# Patient Record
Sex: FEMALE | Race: WHITE | NOT HISPANIC OR LATINO | Employment: UNEMPLOYED | URBAN - METROPOLITAN AREA
[De-identification: names, ages, dates, MRNs, and addresses within clinical notes are randomized per-mention and may not be internally consistent; named-entity substitution may affect disease eponyms.]

---

## 2021-11-20 ENCOUNTER — OFFICE VISIT (OUTPATIENT)
Dept: URGENT CARE | Facility: CLINIC | Age: 26
End: 2021-11-20

## 2021-11-20 VITALS
OXYGEN SATURATION: 100 % | DIASTOLIC BLOOD PRESSURE: 62 MMHG | SYSTOLIC BLOOD PRESSURE: 110 MMHG | TEMPERATURE: 98 F | HEIGHT: 63 IN | HEART RATE: 94 BPM

## 2021-11-20 DIAGNOSIS — L03.114 CELLULITIS OF LEFT UPPER ARM: ICD-10-CM

## 2021-11-20 DIAGNOSIS — T80.90XA INJECTION SITE REACTION, INITIAL ENCOUNTER: ICD-10-CM

## 2021-11-20 DIAGNOSIS — R21 RASH: ICD-10-CM

## 2021-11-20 PROCEDURE — 99203 OFFICE O/P NEW LOW 30 MIN: CPT | Performed by: NURSE PRACTITIONER

## 2021-11-20 RX ORDER — ONDANSETRON 8 MG/1
TABLET, ORALLY DISINTEGRATING ORAL
COMMUNITY
End: 2021-12-26

## 2021-11-20 RX ORDER — ALBUTEROL SULFATE 90 UG/1
AEROSOL, METERED RESPIRATORY (INHALATION)
COMMUNITY
End: 2023-06-22 | Stop reason: SDUPTHER

## 2021-11-20 RX ORDER — CEPHALEXIN 500 MG/1
500 CAPSULE ORAL 4 TIMES DAILY
Qty: 28 CAPSULE | Refills: 0 | Status: SHIPPED | OUTPATIENT
Start: 2021-11-20 | End: 2021-11-27

## 2021-11-20 RX ORDER — TRIAMCINOLONE ACETONIDE 1 MG/G
1 CREAM TOPICAL 2 TIMES DAILY
Qty: 15 G | Refills: 0 | Status: SHIPPED | OUTPATIENT
Start: 2021-11-20 | End: 2022-03-28

## 2021-11-20 RX ORDER — HYDROCODONE BITARTRATE AND ACETAMINOPHEN 5; 325 MG/1; MG/1
TABLET ORAL
COMMUNITY
End: 2021-12-26

## 2021-11-20 RX ORDER — OXYCODONE HYDROCHLORIDE AND ACETAMINOPHEN 5; 325 MG/1; MG/1
TABLET ORAL
COMMUNITY
End: 2021-12-26

## 2021-11-20 ASSESSMENT — ENCOUNTER SYMPTOMS
CHILLS: 0
MUSCULOSKELETAL NEGATIVE: 1
FEVER: 0
NEUROLOGICAL NEGATIVE: 1
CONSTITUTIONAL NEGATIVE: 1

## 2021-11-20 ASSESSMENT — VISUAL ACUITY: OU: 1

## 2021-11-20 NOTE — PROGRESS NOTES
"Subjective:     Baylee Perez is a 26 y.o. female who presents for Arm Pain (x 3days, pain in arm after shot, tender/warm/swelling/, pain in armpit iand)       Arm Pain   The pain has been worsening since the incident.     Patient received Moderna vaccine as well as flu vaccine on 11/18/2021.    Patient reports receiving the flu shot on her right upper arm as well as the Covid shot on the left upper arm.    Today, noticed a circular area of swelling, redness, and warmth.  Circled area to monitor for any progression.  Reports tenderness to touch with pain radiating to her armpit.    Patient was screened prior to rooming and denied COVID-19 diagnosis or contact with a person who has been diagnosed or is suspected to have COVID-19. During this visit, appropriate PPE was worn, hand hygiene was performed, and the patient and any visitors were masked.     PMH:  has no past medical history on file.    MEDS:   Current Outpatient Medications:   •  cephALEXin (KEFLEX) 500 MG Cap, Take 1 Capsule by mouth 4 times a day for 7 days., Disp: 28 Capsule, Rfl: 0  •  triamcinolone acetonide (KENALOG) 0.1 % Cream, Apply 1 Application topically 2 times a day., Disp: 15 g, Rfl: 0    ALLERGIES: No Known Allergies    SURGHX: History reviewed. No pertinent surgical history.    SOCHX:  reports that she has never smoked. She has never used smokeless tobacco.     FH: Reviewed with patient, not pertinent to this visit.    Review of Systems   Constitutional: Negative.  Negative for chills, fever and malaise/fatigue.   Musculoskeletal: Negative.    Skin:        Left upper arm redness, swelling, warmth, tenderness   Neurological: Negative.    All other systems reviewed and are negative.    Additional details per HPI.      Objective:     /62 (BP Location: Right arm, Patient Position: Sitting, BP Cuff Size: Adult)   Pulse 94   Temp 36.7 °C (98 °F) (Temporal)   Ht 1.6 m (5' 3\")   SpO2 100%     Physical Exam  Vitals reviewed. "   Constitutional:       General: She is not in acute distress.     Appearance: She is well-developed. She is not ill-appearing or toxic-appearing.   HENT:      Right Ear: External ear normal.      Left Ear: External ear normal.   Eyes:      General: Vision grossly intact.      Extraocular Movements: Extraocular movements intact.      Conjunctiva/sclera: Conjunctivae normal.   Cardiovascular:      Rate and Rhythm: Normal rate.      Pulses: Normal pulses.   Pulmonary:      Effort: Pulmonary effort is normal. No respiratory distress.   Musculoskeletal:         General: No deformity. Normal range of motion.      Left upper arm: Swelling and tenderness present. No deformity or lacerations.      Cervical back: Normal range of motion.      Comments: Approx 3.5 x 3.5 cm area of induration, erythema, warmth, and tenderness; skin intact, no fluctuance or discharge, axilla clear   Skin:     General: Skin is warm and dry.      Coloration: Skin is not pale.      Findings: Erythema present.   Neurological:      Mental Status: She is alert and oriented to person, place, and time.      Sensory: No sensory deficit.      Motor: No weakness.   Psychiatric:         Behavior: Behavior normal. Behavior is cooperative.       Assessment/Plan:     1. Cellulitis of left upper arm  - cephALEXin (KEFLEX) 500 MG Cap; Take 1 Capsule by mouth 4 times a day for 7 days.  Dispense: 28 Capsule; Refill: 0    2. Rash  - triamcinolone acetonide (KENALOG) 0.1 % Cream; Apply 1 Application topically 2 times a day.  Dispense: 15 g; Refill: 0    3. Injection site reaction, initial encounter  - triamcinolone acetonide (KENALOG) 0.1 % Cream; Apply 1 Application topically 2 times a day.  Dispense: 15 g; Refill: 0    Rx as above sent electronically.  Keflex for coverage of secondary infectious process.  Possible localized injection site reaction.  Kenalog cream for symptom relief    Differential diagnosis, natural history, supportive care, over-the-counter  symptom management per 's instructions, close monitoring, and indications for immediate follow-up discussed.     All questions answered. Patient agrees with the plan of care.    Discharge summary provided through [facility].

## 2021-12-26 ENCOUNTER — OFFICE VISIT (OUTPATIENT)
Dept: URGENT CARE | Facility: CLINIC | Age: 26
End: 2021-12-26
Payer: COMMERCIAL

## 2021-12-26 VITALS
SYSTOLIC BLOOD PRESSURE: 110 MMHG | DIASTOLIC BLOOD PRESSURE: 68 MMHG | HEART RATE: 55 BPM | TEMPERATURE: 99 F | RESPIRATION RATE: 14 BRPM | WEIGHT: 136 LBS | HEIGHT: 63 IN | BODY MASS INDEX: 24.1 KG/M2 | OXYGEN SATURATION: 99 %

## 2021-12-26 DIAGNOSIS — L50.9 URTICARIA: ICD-10-CM

## 2021-12-26 PROCEDURE — 99214 OFFICE O/P EST MOD 30 MIN: CPT | Performed by: STUDENT IN AN ORGANIZED HEALTH CARE EDUCATION/TRAINING PROGRAM

## 2021-12-26 RX ORDER — METHYLPREDNISOLONE 4 MG/1
TABLET ORAL
Qty: 21 TABLET | Refills: 0 | Status: SHIPPED | OUTPATIENT
Start: 2021-12-26 | End: 2022-03-28

## 2021-12-26 ASSESSMENT — ENCOUNTER SYMPTOMS
NECK PAIN: 0
PALPITATIONS: 0
FEVER: 0
SORE THROAT: 0
URTICARIA: 1
COUGH: 0
SHORTNESS OF BREATH: 0
WHEEZING: 0
EYE PAIN: 0
CHILLS: 0
MYALGIAS: 0
ABDOMINAL PAIN: 0
HEADACHES: 0
NAUSEA: 0
VOMITING: 0

## 2021-12-26 NOTE — PROGRESS NOTES
"Subjective     Baylee Ceballos is a 26 y.o. female who presents with Urticaria (x10 dyas, all over body )            Urticaria  Pertinent negatives include no congestion, cough, eye pain, fever, shortness of breath, sore throat or vomiting.     Patient reported 10 days of skin hives and itching, the rash comes and goes, no significant changes for the past 10 days. 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  She has been taking zyrtec and benadryl but still has a frequent hives. She reported a history of multiple allergies, including soaps, perfumes, animals. She said that she used a down blanket during the vacation, which may triggered her symptoms. She denies wheezing, shortness of breath, no problem with swallowing,     No f//n/v, sob, cp, abdominal pain      Review of Systems   Constitutional: Negative for chills and fever.   HENT: Negative for congestion and sore throat.    Eyes: Negative for pain.   Respiratory: Negative for cough, shortness of breath and wheezing.    Cardiovascular: Negative for chest pain and palpitations.   Gastrointestinal: Negative for abdominal pain, nausea and vomiting.   Musculoskeletal: Negative for myalgias and neck pain.   Neurological: Negative for headaches.              Objective     /68   Pulse (!) 55   Temp 37.2 °C (99 °F) (Temporal)   Resp 14   Ht 1.6 m (5' 3\")   Wt 61.7 kg (136 lb)   SpO2 99%   BMI 24.09 kg/m²      Physical Exam  Constitutional:       Appearance: Normal appearance.   HENT:      Head: Normocephalic.      Nose: Nose normal.      Mouth/Throat:      Mouth: Mucous membranes are moist.   Eyes:      Extraocular Movements: Extraocular movements intact.   Cardiovascular:      Rate and Rhythm: Normal rate and regular rhythm.      Pulses: Normal pulses.   Pulmonary:      Effort: Pulmonary effort is normal.   Abdominal:      Palpations: Abdomen is soft.   Musculoskeletal:         General: Normal range of motion.      Cervical " back: Normal range of motion.   Skin:     General: Skin is warm.             Comments: Diffuse macula over the front chest, upper extremity and thigh area   Neurological:      Mental Status: She is alert and oriented to person, place, and time.   Psychiatric:         Mood and Affect: Mood normal.                             Assessment & Plan        1. Urticaria  Advised to continue over-the-counter antihistamine medication, may consider add anti-H2 medication like ranitidine for chronic urticaria. Advised to avoid allergens as much as possible    - methylPREDNISolone (MEDROL DOSEPAK) 4 MG Tablet Therapy Pack; Follow schedule on package instructions.  Dispense: 21 Tablet; Refill: 0     Patient was advised to go to ER or urgent care if symptoms are not improved or getting worse

## 2021-12-26 NOTE — PATIENT INSTRUCTIONS
Hives  Hives (urticaria) are itchy, red, swollen areas on the skin. Hives can appear on any part of the body. Hives often fade within 24 hours (acute hives). Sometimes, new hives appear after old ones fade and the cycle can continue for several days or weeks (chronic hives). Hives do not spread from person to person (are not contagious).  Hives come from the body's reaction to something a person is allergic to (allergen), something that causes irritation, or various other triggers. When a person is exposed to a trigger, his or her body releases a chemical (histamine) that causes redness, itching, and swelling. Hives can appear right after exposure to a trigger or hours later.  What are the causes?  This condition may be caused by:  · Allergies to foods or ingredients.  · Insect bites or stings.  · Exposure to pollen or pets.  · Contact with latex or chemicals.  · Spending time in sunlight, heat, or cold (exposure).  · Exercise.  · Stress.  · Certain medicines.  You can also get hives from other medical conditions and treatments, such as:  · Viruses, including the common cold.  · Bacterial infections, such as urinary tract infections and strep throat.  · Certain medicines.  · Allergy shots.  · Blood transfusions.  Sometimes, the cause of this condition is not known (idiopathic hives).  What increases the risk?  You are more likely to develop this condition if you:  · Are a woman.  · Have food allergies, especially to citrus fruits, milk, eggs, peanuts, tree nuts, or shellfish.  · Are allergic to:  ? Medicines.  ? Latex.  ? Insects.  ? Animals.  ? Pollen.  What are the signs or symptoms?  Common symptoms of this condition include raised, itchy, red or white bumps or patches on your skin. These areas may:  · Become large and swollen (welts).  · Change in shape and location, quickly and repeatedly.  · Be separate hives or connect over a large area of skin.  · Sting or become painful.  · Turn white when pressed in the  [place]  In severe cases, your hands, feet, and face may also become swollen. This may occur if hives develop deeper in your skin.  How is this diagnosed?  This condition may be diagnosed by your symptoms, medical history, and physical exam.  · Your skin, urine, or blood may be tested to find out what is causing your hives and to rule out other health issues.  · Your health care provider may also remove a small sample of skin from the affected area and examine it under a microscope (biopsy).  How is this treated?  Treatment for this condition depends on the cause and severity of your symptoms. Your health care provider may recommend using cool, wet cloths (cool compresses) or taking cool showers to relieve itching. Treatment may include:  · Medicines that help:  ? Relieve itching (antihistamines).  ? Reduce swelling (corticosteroids).  ? Treat infection (antibiotics).  · An injectable medicine (omalizumab). Your health care provider may prescribe this if you have chronic idiopathic hives and you continue to have symptoms even after treatment with antihistamines.  Severe cases may require an emergency injection of adrenaline (epinephrine) to prevent a life-threatening allergic reaction (anaphylaxis).  Follow these instructions at home:  Medicines  · Take and apply over-the-counter and prescription medicines only as told by your health care provider.  · If you were prescribed an antibiotic medicine, take it as told by your health care provider. Do not stop using the antibiotic even if you start to feel better.  Skin care  · Apply cool compresses to the affected areas.  · Do not scratch or rub your skin.  General instructions  · Do not take hot showers or baths. This can make itching worse.  · Do not wear tight-fitting clothing.  · Use sunscreen and wear protective clothing when you are outside.  · Avoid any substances that cause your hives. Keep a journal to help track what causes your hives. Write  down:  ? What medicines you take.  ? What you eat and drink.  ? What products you use on your skin.  · Keep all follow-up visits as told by your health care provider. This is important.  Contact a health care provider if:  · Your symptoms are not controlled with medicine.  · Your joints are painful or swollen.  Get help right away if:  · You have a fever.  · You have pain in your abdomen.  · Your tongue or lips are swollen.  · Your eyelids are swollen.  · Your chest or throat feels tight.  · You have trouble breathing or swallowing.  These symptoms may represent a serious problem that is an emergency. Do not wait to see if the symptoms will go away. Get medical help right away. Call your local emergency services (911 in the [place] Do not drive yourself to the hospital.  Summary  · Hives (urticaria) are itchy, red, swollen areas on your skin. Hives come from the body's reaction to something a person is allergic to (allergen), something that causes irritation, or various other triggers.  · Treatment for this condition depends on the cause and severity of your symptoms.  · Avoid any substances that cause your hives. Keep a journal to help track what causes your hives.  · Take and apply over-the-counter and prescription medicines only as told by your health care provider.  · Keep all follow-up visits as told by your health care provider. This is important.  This information is not intended to replace advice given to you by your health care provider. Make sure you discuss any questions you have with your health care provider.  Document Released: 12/18/2006 Document Revised: 07/03/2019 Document Reviewed: 07/03/2019  Elsevier Patient Education © 2020 [employer]

## 2022-01-14 ENCOUNTER — TELEPHONE (OUTPATIENT)
Dept: SCHEDULING | Facility: IMAGING CENTER | Age: 27
End: 2022-01-14
Payer: COMMERCIAL

## 2022-01-17 ENCOUNTER — OFFICE VISIT (OUTPATIENT)
Dept: INTERNAL MEDICINE | Facility: OTHER | Age: 27
End: 2022-01-17
Payer: COMMERCIAL

## 2022-01-17 VITALS
TEMPERATURE: 98.3 F | HEIGHT: 63 IN | BODY MASS INDEX: 24.27 KG/M2 | DIASTOLIC BLOOD PRESSURE: 62 MMHG | OXYGEN SATURATION: 97 % | HEART RATE: 93 BPM | WEIGHT: 137 LBS | SYSTOLIC BLOOD PRESSURE: 109 MMHG

## 2022-01-17 DIAGNOSIS — L40.9 PSORIASIS: ICD-10-CM

## 2022-01-17 DIAGNOSIS — J45.990 EXERCISE-INDUCED ASTHMA: ICD-10-CM

## 2022-01-17 DIAGNOSIS — F39 MOOD DISORDER (HCC): ICD-10-CM

## 2022-01-17 DIAGNOSIS — T78.40XA ALLERGY, INITIAL ENCOUNTER: Primary | ICD-10-CM

## 2022-01-17 DIAGNOSIS — Z00.00 HEALTHCARE MAINTENANCE: ICD-10-CM

## 2022-01-17 PROCEDURE — 99204 OFFICE O/P NEW MOD 45 MIN: CPT | Mod: GC | Performed by: STUDENT IN AN ORGANIZED HEALTH CARE EDUCATION/TRAINING PROGRAM

## 2022-01-17 RX ORDER — EPINEPHRINE 0.15 MG/.15ML
0.15 INJECTION SUBCUTANEOUS PRN
Qty: 1 EACH | Refills: 2 | Status: SHIPPED | OUTPATIENT
Start: 2022-01-17 | End: 2023-06-09

## 2022-01-17 RX ORDER — CETIRIZINE HYDROCHLORIDE 10 MG/1
20 TABLET ORAL 2 TIMES DAILY
COMMUNITY
End: 2022-12-28

## 2022-01-17 RX ORDER — EPINEPHRINE 0.15 MG/.15ML
INJECTION SUBCUTANEOUS
Qty: 1 EACH | Refills: 0 | Status: SHIPPED | OUTPATIENT
Start: 2022-01-17 | End: 2022-01-17

## 2022-01-17 ASSESSMENT — PATIENT HEALTH QUESTIONNAIRE - PHQ9
5. POOR APPETITE OR OVEREATING: 0 - NOT AT ALL
SUM OF ALL RESPONSES TO PHQ QUESTIONS 1-9: 4
CLINICAL INTERPRETATION OF PHQ2 SCORE: 2

## 2022-01-17 NOTE — PROGRESS NOTES
New Patient    Chief Complaint   Patient presents with   • Establish Care   • Urticaria     x 1 month       HPI: Mrs. Baylee Ceballos is a 26 y.o. female with a past medical history allergies, exercise-induced asthma, psoriasis, mood disorder, IUD for contraception (last placed in March 2021, effective for 3 years), who presented to the clinic to establish care along with her daughter.  Her  is a family medicine physician, who moved to [place] in June, 2021.    #Allergies: She has on and off skin rashes with itching.  She does not know that trigger but has noticed some changes with different clothes.  She is on cetirizine 20 mg twice daily.  Never used an EpiPen.  She used to see an allergy specialist until the age of 15.  Right now she is on Medrol Dosepak for urticaria.  Denies any acute symptoms at this time    #Exercise-induced asthma: She has albuterol rescue inhaler, on average uses 3-4 times a month.  Symptoms are at her baseline at this time    #Mood disorder: She would like to have a referral for therapy.  Denies any symptoms of depression or anxiety at this time.    #Healthcare maintenance: She had a Pap smear done in 3/2021 with HPV.,  Not due until 2026.  Basic labs ordered at this visit.  She got the flu shot in 2021 and COVID booster dose in 2021    Patient Active Problem List    Diagnosis Date Noted   • Exercise-induced asthma 01/17/2022   • Healthcare maintenance 01/17/2022   • Allergies 01/17/2022   • Mood disorder (HCC) 01/17/2022   • Urticaria 12/26/2021       Current Outpatient Medications   Medication Sig Dispense Refill   • cetirizine (ZYRTEC) 10 MG Tab Take 20 mg by mouth 2 times a day.     • EPINEPHrine 0.15 MG/0.15ML Solution Auto-injector Inject 0.15 mg into the shoulder, thigh, or buttocks as needed. Inject 0.15 mL into the thigh one time for 1 dose. 1 Each 2   • albuterol 108 (90 Base) MCG/ACT Aero Soln inhalation aerosol albuterol sulfate HFA 90 mcg/actuation aerosol inhaler   "   • levonorgestrel (MIRENA, 52 MG,) 52 mg (20 mcg/24 hr) IUD Mirena 20 mcg/24 hours (7 yrs) 52 mg intrauterine device   Take 1 device by intrauterine route.     • methylPREDNISolone (MEDROL DOSEPAK) 4 MG Tablet Therapy Pack Follow schedule on package instructions. (Patient not taking: Reported on 1/17/2022) 21 Tablet 0   • triamcinolone acetonide (KENALOG) 0.1 % Cream Apply 1 Application topically 2 times a day. (Patient not taking: Reported on 12/26/2021) 15 g 0     No current facility-administered medications for this visit.       ROS: As per HPI. Additional pertinent systems as noted below.  Constitutional:  Negative for fever, chills   HENT:  Negative for ear pain, sore throat, runny nose   Eyes:  Negative for blurred vision and double vision   Cardiovascular:  Negative for chest pain, palpitations   Respiratory:  Negative for cough, shortness of breath   Gastrointestinal: Negative for abdominal pain, nausea, vomiting, diarrhea, or blood in stools   Genitourinary: Negative for dysuria, flank pain and hematuria  Skin: See HPI  Extremities: Negative for leg swelling   Neurologic: Negative for headaches, dizziness, seizures, weakness   Endo/Heme/Allergies: Negative for polydipsia. Does not bruise/bleed easily  Psychiatric: Negative for suicidal or homicidal ideas     /62 (BP Location: Left arm, Patient Position: Sitting, BP Cuff Size: Adult)   Pulse 93   Temp 36.8 °C (98.3 °F) (Temporal)   Ht 1.6 m (5' 3\")   Wt 62.1 kg (137 lb)   SpO2 97%   BMI 24.27 kg/m²     Physical Exam   Constitutional:  Well developed, well nourished. Not in acute distress   HENT:  Normocephalic, Atraumatic, Oropharynx is pink and moist. No oral exudates, Nose normal   Eyes:  Conjunctiva normal, No discharge. PERRLA   Neck:  Normal range of motion, No cervical lymphadenopathy. No thyromegaly.   Cardiovascular:  Regular rate and rhythm, No pedal edema, Intact distal pulses   Respiratory: Clear to auscultation bilaterally, No use " of accessory muscles   Gastrointestinal: Soft, No tenderness  Skin: Warm, Dry, Erythematous rash with itching on both forearms +  Musculoskeletal: No cyanosis or clubbing, normal ROM   Neurologic: Alert & oriented x 4, No focal deficits noted, cranial nerves II through X are grossly intact.   Psychiatric: Judgment normal, Mood normal, Memory normal     Note: I have reviewed all pertinent labs and diagnostic tests associated with this visit with specific comments listed under the assessment and plan below    Assessment and Plan  Allergies  - Unknown triggers, skin rash with itching  -Referral made to allergy specialist  -Continue cetirizine 20 mg twice daily  -EpiPen as needed for emergency    Healthcare maintenance  - Basic labs ordered  -Pap smear with HPV 3/2021, due in 2026  -Flu shot 2021  -COVID booster in December 2021    Exercise-induced asthma  - Symptoms stable  -On albuterol as needed    Mood disorder (HCC)  - Denies any active symptoms at this time  -Referral made for psychology for counseling    Followup: Return in about 10 years (around 1/17/2032).    Patient seen with attending.    Signed by: Zenobia Madsen M.D.    Please note that this dictation was created using voice recognition software. I have made every reasonable attempt to correct obvious errors, but I expect that there are errors of grammar and possibly content that I did not discover before finalizing the note.

## 2022-01-17 NOTE — PATIENT INSTRUCTIONS
-Continue medications as directed  -Continue lifestyle modifications - daily exercise and dietary changes  -Referral sent to allergy specialist, please call and follow up if you do not hear back in 2 weeks  -Please get labs prior to next visit  -Follow up visit in 10 weeks

## 2022-01-17 NOTE — ASSESSMENT & PLAN NOTE
- Unknown triggers, skin rash with itching  -Referral made to allergy specialist  -Continue cetirizine 20 mg twice daily  -EpiPen as needed for emergency

## 2022-01-17 NOTE — ASSESSMENT & PLAN NOTE
- Symptoms stable  -On albuterol as needed   Good morning,    Patient was in ER for SA  Looks likedid sign a 201  I was hoping you could follow-up with family and see how they are holding up  Thank you!

## 2022-01-17 NOTE — ASSESSMENT & PLAN NOTE
- Basic labs ordered  -Pap smear with HPV 3/2021, due in 2026  -Flu shot 2021  -COVID booster in December 2021

## 2022-03-04 ENCOUNTER — HOSPITAL ENCOUNTER (OUTPATIENT)
Dept: LAB | Facility: MEDICAL CENTER | Age: 27
End: 2022-03-04
Attending: STUDENT IN AN ORGANIZED HEALTH CARE EDUCATION/TRAINING PROGRAM
Payer: COMMERCIAL

## 2022-03-04 DIAGNOSIS — Z00.00 HEALTHCARE MAINTENANCE: ICD-10-CM

## 2022-03-04 DIAGNOSIS — T78.40XA ALLERGY, INITIAL ENCOUNTER: ICD-10-CM

## 2022-03-04 LAB
ALBUMIN SERPL BCP-MCNC: 5 G/DL (ref 3.2–4.9)
ALBUMIN/GLOB SERPL: 1.9 G/DL
ALP SERPL-CCNC: 52 U/L (ref 30–99)
ALT SERPL-CCNC: 10 U/L (ref 2–50)
ANION GAP SERPL CALC-SCNC: 14 MMOL/L (ref 7–16)
AST SERPL-CCNC: 17 U/L (ref 12–45)
BASOPHILS # BLD AUTO: 0.6 % (ref 0–1.8)
BASOPHILS # BLD: 0.04 K/UL (ref 0–0.12)
BILIRUB SERPL-MCNC: 0.6 MG/DL (ref 0.1–1.5)
BUN SERPL-MCNC: 12 MG/DL (ref 8–22)
CALCIUM SERPL-MCNC: 9.5 MG/DL (ref 8.5–10.5)
CHLORIDE SERPL-SCNC: 105 MMOL/L (ref 96–112)
CHOLEST SERPL-MCNC: 142 MG/DL (ref 100–199)
CO2 SERPL-SCNC: 22 MMOL/L (ref 20–33)
CREAT SERPL-MCNC: 0.74 MG/DL (ref 0.5–1.4)
EOSINOPHIL # BLD AUTO: 0.11 K/UL (ref 0–0.51)
EOSINOPHIL NFR BLD: 1.6 % (ref 0–6.9)
ERYTHROCYTE [DISTWIDTH] IN BLOOD BY AUTOMATED COUNT: 47.1 FL (ref 35.9–50)
EST. AVERAGE GLUCOSE BLD GHB EST-MCNC: 97 MG/DL
GLOBULIN SER CALC-MCNC: 2.7 G/DL (ref 1.9–3.5)
GLUCOSE SERPL-MCNC: 76 MG/DL (ref 65–99)
HBA1C MFR BLD: 5 % (ref 4–5.6)
HCT VFR BLD AUTO: 44.1 % (ref 37–47)
HDLC SERPL-MCNC: 50 MG/DL
HGB BLD-MCNC: 14 G/DL (ref 12–16)
IMM GRANULOCYTES # BLD AUTO: 0.01 K/UL (ref 0–0.11)
IMM GRANULOCYTES NFR BLD AUTO: 0.1 % (ref 0–0.9)
LDLC SERPL CALC-MCNC: 76 MG/DL
LYMPHOCYTES # BLD AUTO: 1.74 K/UL (ref 1–4.8)
LYMPHOCYTES NFR BLD: 24.9 % (ref 22–41)
MCH RBC QN AUTO: 29.2 PG (ref 27–33)
MCHC RBC AUTO-ENTMCNC: 31.7 G/DL (ref 33.6–35)
MCV RBC AUTO: 91.9 FL (ref 81.4–97.8)
MONOCYTES # BLD AUTO: 0.43 K/UL (ref 0–0.85)
MONOCYTES NFR BLD AUTO: 6.1 % (ref 0–13.4)
NEUTROPHILS # BLD AUTO: 4.67 K/UL (ref 2–7.15)
NEUTROPHILS NFR BLD: 66.7 % (ref 44–72)
NRBC # BLD AUTO: 0 K/UL
NRBC BLD-RTO: 0 /100 WBC
PLATELET # BLD AUTO: 247 K/UL (ref 164–446)
PMV BLD AUTO: 11.4 FL (ref 9–12.9)
POTASSIUM SERPL-SCNC: 4.2 MMOL/L (ref 3.6–5.5)
PROT SERPL-MCNC: 7.7 G/DL (ref 6–8.2)
RBC # BLD AUTO: 4.8 M/UL (ref 4.2–5.4)
SODIUM SERPL-SCNC: 141 MMOL/L (ref 135–145)
T4 FREE SERPL-MCNC: 1.15 NG/DL (ref 0.93–1.7)
TRIGL SERPL-MCNC: 80 MG/DL (ref 0–149)
TSH SERPL DL<=0.005 MIU/L-ACNC: 2.12 UIU/ML (ref 0.38–5.33)
WBC # BLD AUTO: 7 K/UL (ref 4.8–10.8)

## 2022-03-04 PROCEDURE — 84443 ASSAY THYROID STIM HORMONE: CPT

## 2022-03-04 PROCEDURE — 83036 HEMOGLOBIN GLYCOSYLATED A1C: CPT

## 2022-03-04 PROCEDURE — 85025 COMPLETE CBC W/AUTO DIFF WBC: CPT

## 2022-03-04 PROCEDURE — 80053 COMPREHEN METABOLIC PANEL: CPT

## 2022-03-04 PROCEDURE — 80061 LIPID PANEL: CPT

## 2022-03-04 PROCEDURE — 36415 COLL VENOUS BLD VENIPUNCTURE: CPT

## 2022-03-04 PROCEDURE — 84439 ASSAY OF FREE THYROXINE: CPT

## 2022-03-28 ENCOUNTER — OFFICE VISIT (OUTPATIENT)
Dept: INTERNAL MEDICINE | Facility: OTHER | Age: 27
End: 2022-03-28
Payer: COMMERCIAL

## 2022-03-28 VITALS
SYSTOLIC BLOOD PRESSURE: 100 MMHG | DIASTOLIC BLOOD PRESSURE: 59 MMHG | HEIGHT: 63 IN | WEIGHT: 133 LBS | TEMPERATURE: 97.7 F | BODY MASS INDEX: 23.57 KG/M2 | OXYGEN SATURATION: 100 % | HEART RATE: 56 BPM

## 2022-03-28 DIAGNOSIS — F39 MOOD DISORDER (HCC): ICD-10-CM

## 2022-03-28 DIAGNOSIS — Z00.00 HEALTHCARE MAINTENANCE: ICD-10-CM

## 2022-03-28 PROCEDURE — 99213 OFFICE O/P EST LOW 20 MIN: CPT | Mod: GE | Performed by: STUDENT IN AN ORGANIZED HEALTH CARE EDUCATION/TRAINING PROGRAM

## 2022-03-28 ASSESSMENT — ANXIETY QUESTIONNAIRES
GAD7 TOTAL SCORE: 9
4. TROUBLE RELAXING: SEVERAL DAYS
3. WORRYING TOO MUCH ABOUT DIFFERENT THINGS: MORE THAN HALF THE DAYS
5. BEING SO RESTLESS THAT IT IS HARD TO SIT STILL: NOT AT ALL
1. FEELING NERVOUS, ANXIOUS, OR ON EDGE: MORE THAN HALF THE DAYS
7. FEELING AFRAID AS IF SOMETHING AWFUL MIGHT HAPPEN: NOT AT ALL
6. BECOMING EASILY ANNOYED OR IRRITABLE: MORE THAN HALF THE DAYS
2. NOT BEING ABLE TO STOP OR CONTROL WORRYING: MORE THAN HALF THE DAYS

## 2022-03-28 ASSESSMENT — FIBROSIS 4 INDEX: FIB4 SCORE: 0.57

## 2022-03-28 ASSESSMENT — PATIENT HEALTH QUESTIONNAIRE - PHQ9
CLINICAL INTERPRETATION OF PHQ2 SCORE: 6
SUM OF ALL RESPONSES TO PHQ QUESTIONS 1-9: 15
5. POOR APPETITE OR OVEREATING: 1 - SEVERAL DAYS

## 2022-03-28 NOTE — ASSESSMENT & PLAN NOTE
-SAGECAPS positive for atleast 5 symptoms for at least 2 week period (excessive sleep, lack of interest, feelings of guilt, lack of energy, concentration)  -Denies any suicidal or homicidal ideation  -PHQ-9 scored at 15, indicates moderately severe depression  -MARY-7 scored at 9, indicates mild anxiety  -Recent thyroid function WNL  -Started on Zoloft 50 mg once daily  -Has an appointment for counseling in 2 months  -Lifestyle modifications: Exercise, music and relaxation techniques discussed

## 2022-03-28 NOTE — PROGRESS NOTES
Established Patient    Chief Complaint   Patient presents with   • Depression   • Follow-Up       HPI: Mrs. Baylee Ceballos is a 26 y.o. female with a past medical history allergies, exercise-induced asthma, psoriasis, mood disorder, IUD for contraception (last placed in March 2021, effective for 3 years), who presented to the clinic for the following.    Recent labs reviewed and discussed with the patient  CBC: WNL  CMP: WNL  TSH with free T4: WNL    Depression: Her ex-, who is the father of her 7-year-old daughter, recently moved to [place].  She has been feeling depressed recently-excessive sleep, fatigue lack of interest in doing things, feeling guilt.  Denies any suicidal or homicidal ideation.  She has a referral placed for counseling at last visit, appointment scheduled in 2 months.  She is requesting help with medication.  She used to Zoloft as a teenager which helped her well.    Denies any other acute symptoms at this visit.  Her allergies were well controlled, no skin rash at this time.  She has a referral placed to allergy specialist at last visit, appointment scheduled in next month.    Patient Active Problem List    Diagnosis Date Noted   • Exercise-induced asthma 01/17/2022   • Healthcare maintenance 01/17/2022   • Allergies 01/17/2022   • Mood disorder (HCC) 01/17/2022   • Psoriasis 01/17/2022   • Urticaria 12/26/2021       Current Outpatient Medications   Medication Sig Dispense Refill   • sertraline (ZOLOFT) 50 MG Tab Take 1 Tablet by mouth every day. 30 Tablet 1   • cetirizine (ZYRTEC) 10 MG Tab Take 20 mg by mouth 2 times a day.     • EPINEPHrine 0.15 MG/0.15ML Solution Auto-injector Inject 0.15 mg into the shoulder, thigh, or buttocks as needed. Inject 0.15 mL into the thigh one time for 1 dose. 1 Each 2   • albuterol 108 (90 Base) MCG/ACT Aero Soln inhalation aerosol albuterol sulfate HFA 90 mcg/actuation aerosol inhaler     • levonorgestrel (MIRENA, 52 MG,) 52 mg (20 mcg/24 hr)  "IUD Mirena 20 mcg/24 hours (7 yrs) 52 mg intrauterine device   Take 1 device by intrauterine route.       No current facility-administered medications for this visit.     ROS: As per HPI. Additional pertinent systems as noted below.  Constitutional:  Negative for fever, chills.  Fatigue +  HENT:  Negative for ear pain, sore throat, runny nose   Eyes:  Negative for blurred vision and double vision   Cardiovascular:  Negative for chest pain, palpitations   Respiratory:  Negative for cough, sputum production, shortness of breath   Gastrointestinal: Negative for abdominal pain, nausea, vomiting, diarrhea  Skin: Negative for rash, itching  Extremities: Negative for leg swelling   Neurologic: Negative for headaches, dizziness, seizures, weakness   Psychiatric: See HPI.  Negative for suicidal or homicidal ideas     /59 (BP Location: Left arm, Patient Position: Sitting, BP Cuff Size: Adult)   Pulse (!) 56   Temp 36.5 °C (97.7 °F) (Temporal)   Ht 1.6 m (5' 3\")   Wt 60.3 kg (133 lb)   SpO2 100%   BMI 23.56 kg/m²     Physical Exam   Constitutional:  Well developed, well nourished. Not in acute distress   HENT:  Normocephalic, Atraumatic, Oropharynx is pink and moist. No oral exudates, Nose normal   Eyes:  EOMI, Conjunctiva normal, No discharge. PERRLA   Cardiovascular:  Regular rate and rhythm, No pedal edema, Intact distal pulses   Respiratory: Clear to auscultation bilaterally, No use of accessory muscles   Skin: Warm, Dry, No erythema, No rash, no induration.   Musculoskeletal: No cyanosis or clubbing, normal ROM   Neurologic: Alert & oriented x 4, No focal deficits noted  Psychiatric: Judgment normal, Mood normal, Memory normal     Note: I have reviewed all pertinent labs and diagnostic tests associated with this visit with specific comments listed under the assessment and plan below    Assessment and Plan  Mood disorder (HCC)  -SAGECAPS positive for atleast 5 symptoms for at least 2 week period (excessive " sleep, lack of interest, feelings of guilt, lack of energy, concentration)  -Denies any suicidal or homicidal ideation  -PHQ-9 scored at 15, indicates moderately severe depression  -MARY-7 scored at 9, indicates mild anxiety  -Recent thyroid function WNL  -Started on Zoloft 50 mg once daily  -Has an appointment for counseling in 2 months  -Lifestyle modifications: Exercise, music and relaxation techniques discussed    Healthcare maintenance  Basic labs done in 3/2022-WNL  -Pap smear with HPV 3/2021, due in 2026  -Flu shot 2021  -COVID booster in December 2021    Followup: Return in about 10 weeks (around 6/6/2022).    Patient discussed with attending.    Signed by: Zenobia Madsen M.D.    Please note that this dictation was created using voice recognition software. I have made every reasonable attempt to correct obvious errors, but I expect that there are errors of grammar and possibly content that I did not discover before finalizing the note.

## 2022-03-28 NOTE — PATIENT INSTRUCTIONS
-Continue medications as directed  -Continue lifestyle modifications as discussed - daily exercise, listening to music and relaxation techniques  -Follow-up with counseling as scheduled  -Follow-up with allergy specialist as scheduled  -Follow up visit in 10 weeks

## 2022-05-03 ENCOUNTER — OFFICE VISIT (OUTPATIENT)
Dept: BEHAVIORAL HEALTH | Facility: CLINIC | Age: 27
End: 2022-05-03
Payer: COMMERCIAL

## 2022-05-03 DIAGNOSIS — F41.1 GENERALIZED ANXIETY DISORDER: ICD-10-CM

## 2022-05-03 PROCEDURE — 90791 PSYCH DIAGNOSTIC EVALUATION: CPT | Performed by: MARRIAGE & FAMILY THERAPIST
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
## 2022-05-23 DIAGNOSIS — F39 MOOD DISORDER (HCC): ICD-10-CM

## 2022-05-23 NOTE — TELEPHONE ENCOUNTER
Sertraline Refill    Last seen: 3/28/22  by Dr. Madsen  Next appt: 6/6/22 with Dr. Madsen    Was the patient seen in the last year in this department? Yes   Does patient have an active prescription for medications requested? No   Received Request Via: Pharmacy

## 2022-06-06 ENCOUNTER — OFFICE VISIT (OUTPATIENT)
Dept: INTERNAL MEDICINE | Facility: OTHER | Age: 27
End: 2022-06-06
Payer: COMMERCIAL

## 2022-06-06 VITALS
HEIGHT: 63 IN | HEART RATE: 55 BPM | OXYGEN SATURATION: 98 % | DIASTOLIC BLOOD PRESSURE: 72 MMHG | BODY MASS INDEX: 23.99 KG/M2 | TEMPERATURE: 98.9 F | SYSTOLIC BLOOD PRESSURE: 106 MMHG | WEIGHT: 135.4 LBS

## 2022-06-06 DIAGNOSIS — F39 MOOD DISORDER (HCC): ICD-10-CM

## 2022-06-06 DIAGNOSIS — L40.9 PSORIASIS: ICD-10-CM

## 2022-06-06 DIAGNOSIS — R21 SKIN RASH: ICD-10-CM

## 2022-06-06 DIAGNOSIS — F41.1 GENERALIZED ANXIETY DISORDER: ICD-10-CM

## 2022-06-06 PROBLEM — L50.9 URTICARIA: Status: RESOLVED | Noted: 2021-12-26 | Resolved: 2022-06-06

## 2022-06-06 PROCEDURE — 99213 OFFICE O/P EST LOW 20 MIN: CPT | Mod: GE | Performed by: STUDENT IN AN ORGANIZED HEALTH CARE EDUCATION/TRAINING PROGRAM

## 2022-06-06 RX ORDER — HYDROXYZINE HYDROCHLORIDE 25 MG/1
25 TABLET, FILM COATED ORAL
Qty: 15 TABLET | Refills: 0 | Status: SHIPPED | OUTPATIENT
Start: 2022-06-06 | End: 2022-07-12

## 2022-06-06 RX ORDER — LEVONORGESTREL 52 MG/1
INTRAUTERINE DEVICE INTRAUTERINE
COMMUNITY
End: 2022-09-26

## 2022-06-06 RX ORDER — SERTRALINE HYDROCHLORIDE 100 MG/1
100 TABLET, FILM COATED ORAL DAILY
Qty: 30 TABLET | Refills: 1 | Status: SHIPPED | OUTPATIENT
Start: 2022-06-06 | End: 2022-07-11 | Stop reason: SDUPTHER

## 2022-06-06 RX ORDER — CLOTRIMAZOLE 1 %
1 CREAM (GRAM) TOPICAL 2 TIMES DAILY
Qty: 1 EACH | Refills: 1 | Status: SHIPPED | OUTPATIENT
Start: 2022-06-06 | End: 2022-06-16

## 2022-06-06 RX ORDER — BENZOCAINE/MENTHOL 6 MG-10 MG
1 LOZENGE MUCOUS MEMBRANE 2 TIMES DAILY
Qty: 1 EACH | Refills: 0 | Status: SHIPPED | OUTPATIENT
Start: 2022-06-06 | End: 2022-06-13

## 2022-06-06 ASSESSMENT — FIBROSIS 4 INDEX: FIB4 SCORE: 0.57

## 2022-06-06 NOTE — PROGRESS NOTES
Established Patient    Chief Complaint   Patient presents with   • Follow-Up     Medication check up     HPI:  Mrs. Baylee Ceballos is a 26 y.o. female with a past medical history allergies, exercise-induced asthma, psoriasis, mood disorder, IUD for contraception (last placed in March 2021, effective for 3 years), who presented to the clinic for the following.    #Depression: Started on Zoloft 50 mg once daily at last visit. Seen a counsellor in May 2022, reports helpful.  Experienced panic attacks x2 in the last 1 month.  Denies any suicidal or homicidal ideation.Continues to have anxiety but has improved symptoms of depression.  Complains about loose  stool x1 daily.    #Skin rash: Reports skin rash on the right forearm, with itching, started 2 weeks ago. Initially  Used topical antifungal once with no relief, got better with topical steroid for 4 days, then stopped using it.  Denies any change in cosmetics or growths photosensitivity.  Denies any fever or any other rashes anywhere in the body. Has a history of psoriasis, used to follow-up with dermatology.  Requesting referral to dermatology for establishing care.    Patient Active Problem List    Diagnosis Date Noted   • Generalized anxiety disorder 05/03/2022   • Exercise-induced asthma 01/17/2022   • Healthcare maintenance 01/17/2022   • Allergies 01/17/2022   • Mood disorder (HCC) 01/17/2022   • Psoriasis 01/17/2022   • Urticaria 12/26/2021       Current Outpatient Medications   Medication Sig Dispense Refill   • levonorgestrel (MIRENA, 52 MG,) 20 MCG/DAY IUD Mirena 20 mcg/24 hours (7 yrs) 52 mg intrauterine device   Take 1 device by intrauterine route.     • sertraline (ZOLOFT) 50 MG Tab TAKE 1 TABLET BY MOUTH EVERY DAY 30 Tablet 1   • cetirizine (ZYRTEC) 10 MG Tab Take 20 mg by mouth 2 times a day.     • EPINEPHrine 0.15 MG/0.15ML Solution Auto-injector Inject 0.15 mg into the shoulder, thigh, or buttocks as needed. Inject 0.15 mL into the thigh one  "time for 1 dose. 1 Each 2   • albuterol 108 (90 Base) MCG/ACT Aero Soln inhalation aerosol albuterol sulfate HFA 90 mcg/actuation aerosol inhaler       No current facility-administered medications for this visit.     ROS: As per HPI. Additional pertinent systems as noted below.  Constitutional:  Negative for fever, chills.   HENT:  Negative for ear pain, sore throat, runny nose   Eyes:  Negative for blurred vision and double vision   Cardiovascular:  Negative for chest pain, palpitations   Respiratory:  Negative for cough, sputum production, shortness of breath   Gastrointestinal: Loose stool x1 daily, negative for abdominal pain, nausea, vomiting, melena or hematochezia  Skin: See HPI  Extremities: Negative for leg swelling   Neurologic: Negative for headaches, dizziness, seizures, weakness   Psychiatric: See HPI.  Negative for suicidal or homicidal ideas     /72 (BP Location: Left arm, Patient Position: Sitting, BP Cuff Size: Adult)   Pulse (!) 55   Temp 37.2 °C (98.9 °F) (Temporal)   Ht 1.6 m (5' 3\")   Wt 61.4 kg (135 lb 6.4 oz)   SpO2 98%   BMI 23.99 kg/m²     Physical Exam   Constitutional:  Well developed, well nourished. Not in acute distress   HENT:  Normocephalic, Atraumatic, Oropharynx is pink and moist. No oral exudates, Nose normal   Eyes:  EOMI, Conjunctiva normal, No discharge. PERRLA   Cardiovascular:  Regular rate and rhythm, No pedal edema, Intact distal pulses   Respiratory: Clear to auscultation bilaterally, No use of accessory muscles   Skin: Right forearm -4 with circumscribed lesions, erythematous, macular, nontender, no warmth, present on dorsal aspect   Musculoskeletal: No cyanosis or clubbing, normal ROM   Neurologic: Alert & oriented x 4, No focal deficits noted  Psychiatric: Judgment normal, Mood normal, Memory normal.  PHQ-9 score 6, MARY-7 score 6     Note: I have reviewed all pertinent labs and diagnostic tests associated with this visit with specific comments listed under " the assessment and plan below    Assessment and Plan  Mood disorder (HCC)  -Denies any suicidal or homicidal ideation  -PHQ-9 scored at 6 from 15, indicates mild depression  -MARY-7 scored at 6 from 9, indicates mild anxiety  -Increased Zoloft to 100 mg once daily  -Hydroxyzine 25 mg as needed for panic attacks or anxiety  -Follow-up with counseling as scheduled  -Lifestyle modifications: Exercise, music and relaxation techniques discussed  -Will consider changing medication if persistent symptoms of anxiety or worsening diarrhea or any side effects     Skin rash  -Looks like fungal infection  -Topical clotrimazole twice daily and topical 1% hydrocortisone twice daily for 1 week  -Follow-up if symptoms does not improve or worsen     History of psoriasis  -Referral made to dermatology    Healthcare maintenance  Basic labs done in 3/2022-WNL  -Pap smear with HPV 3/2021, due in 2026  -Flu shot 2021  -COVID booster in December 2021    Followup: Return in about 5 weeks (around 7/11/2022).    Patient discussed with attending.    Signed by: Zenobia Madsen M.D.    Please note that this dictation was created using voice recognition software. I have made every reasonable attempt to correct obvious errors, but I expect that there are errors of grammar and possibly content that I did not discover before finalizing the note.

## 2022-06-06 NOTE — PATIENT INSTRUCTIONS
-Continue medications as directed  -Referral made to dermatology  -Continue lifestyle modifications as discussed - daily exercise and relaxation techniques  -Follow up visit in 5 weeks with Dr. Resendiz

## 2022-06-16 ENCOUNTER — OFFICE VISIT (OUTPATIENT)
Dept: DERMATOLOGY | Facility: IMAGING CENTER | Age: 27
End: 2022-06-16
Payer: COMMERCIAL

## 2022-06-16 DIAGNOSIS — L40.9 PSORIASIS: ICD-10-CM

## 2022-06-16 DIAGNOSIS — L70.0 ACNE VULGARIS: ICD-10-CM

## 2022-06-16 PROCEDURE — 99203 OFFICE O/P NEW LOW 30 MIN: CPT | Performed by: DERMATOLOGY

## 2022-06-16 NOTE — PROGRESS NOTES
CC: PSO     Subjective: New patient here for PSO    HPI:face scalp chest and arms  Onset: 2018  Symptoms:  Itchy scaly patches  Treatments: steriods creams, clotrimazole,  Triggered by stress and when daughter is sick    ROS: no fevers/chills. Occ itch.  No cough  Relevant PMH: MARY, mirena IUD  Social: NS;  doctor    PE: Gen:WDWN female in NAD. Skin: focal exam: acne-comedones on chin/lower face. White-papule on r sup eyelid. Pink/Hyperpigmented patch on right volar wrist, <1% BSA      A/P: acne, chin, comedonal:  -reviewed dx/tx to try - retin A - tretinoin 0.025% cream QHS  -reviewed gentle face moisturizer  -f/u 2-3 months    Milia: r sup eyelid:  -b/r  -anticipatory guidance    PSO, by genie, limited today:  -reviewed dx/tx   -cont home supply TAC 0.1% Oint BID ->PRN  -sunexposure without sunburn  -f/u PRN    I have reviewed medications relevant to my specialty.

## 2022-06-23 ENCOUNTER — OFFICE VISIT (OUTPATIENT)
Dept: BEHAVIORAL HEALTH | Facility: CLINIC | Age: 27
End: 2022-06-23
Payer: COMMERCIAL

## 2022-06-23 DIAGNOSIS — F41.1 GENERALIZED ANXIETY DISORDER: ICD-10-CM

## 2022-06-23 PROCEDURE — 90837 PSYTX W PT 60 MINUTES: CPT | Performed by: MARRIAGE & FAMILY THERAPIST
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
## 2022-06-30 ENCOUNTER — OFFICE VISIT (OUTPATIENT)
Dept: BEHAVIORAL HEALTH | Facility: CLINIC | Age: 27
End: 2022-06-30
Payer: COMMERCIAL

## 2022-06-30 DIAGNOSIS — F41.1 GENERALIZED ANXIETY DISORDER: ICD-10-CM

## 2022-06-30 DIAGNOSIS — F39 MOOD DISORDER (HCC): ICD-10-CM

## 2022-06-30 PROCEDURE — 90837 PSYTX W PT 60 MINUTES: CPT | Performed by: MARRIAGE & FAMILY THERAPIST

## 2022-06-30 NOTE — PROGRESS NOTES
[facility]   Therapy Progress Note        Name: Baylee Ceballos  MRN: [id number]  : 1995  Age: 26 y.o.  Date of assessment: 2022  PCP: Zenobia Madsen M.D.  Persons in attendance: Patient  Total session time: 58 minutes      Topics addressed in psychotherapy include: Met with the patient in office for an individual counseling session.  The patient discussed that she feels she is doing much better and is not sure if it is due to her medications working or not.  The patient reported that she is doing jujitsu, working out, eating a weekly vegetarian diet, focusing on her health and well-being.  Worked with the patient on identifying plans of what to do when he is not feeling well, and does not want to get out of bed.  Supported the patient in her approach is to self care.  Several times throughout conversation the patient mentioned her lack of money an attachment when she was growing up.  Reminded this clinician that she was often considered to be luggage that her mother had to drag around.  In future sessions, work with the patient on her self esteem and self worth.    This dictation has been created using voice recognition software and/or scribes. The accuracy of the dictation is limited by the abilities of the software and the expertise of the scribes. I expect there may be some errors of grammar and possibly content. I made every attempt to manually correct the errors within my dictation. However, errors related to voice recognition software and/or scribes may still exist and should be interpreted within the appropriate context.    Objective Observations:   Participation:Active verbal participation, Attentive and Engaged   Grooming:Casual   Cognition:Alert and Fully Oriented   Eye Contact:Good   Mood:Euthymic   Affect:Flexible and Happy   Thought Process:Logical and Goal-directed   Speech:Rate within normal limits and Volume within normal limits    Current Risk:   Suicide:  low   Homicide: low   Self-Harm: low   Relapse: low   Safety Plan Reviewed: not applicable    Care Plan Updated: No    Does patient express agreement with the above plan? Yes     Diagnosis:  1. Generalized anxiety disorder    2. Mood disorder (HCC)        Referral appointment(s) scheduled? VAISHALI Choi.

## 2022-07-11 ENCOUNTER — OFFICE VISIT (OUTPATIENT)
Dept: INTERNAL MEDICINE | Facility: OTHER | Age: 27
End: 2022-07-11
Payer: COMMERCIAL

## 2022-07-11 VITALS
BODY MASS INDEX: 24.13 KG/M2 | OXYGEN SATURATION: 99 % | HEIGHT: 63 IN | SYSTOLIC BLOOD PRESSURE: 96 MMHG | HEART RATE: 50 BPM | DIASTOLIC BLOOD PRESSURE: 61 MMHG | WEIGHT: 136.2 LBS | TEMPERATURE: 98.6 F

## 2022-07-11 DIAGNOSIS — F41.1 GENERALIZED ANXIETY DISORDER: ICD-10-CM

## 2022-07-11 DIAGNOSIS — F39 MOOD DISORDER (HCC): ICD-10-CM

## 2022-07-11 PROCEDURE — 99213 OFFICE O/P EST LOW 20 MIN: CPT | Mod: GE

## 2022-07-11 RX ORDER — SERTRALINE HYDROCHLORIDE 100 MG/1
TABLET, FILM COATED ORAL
Qty: 100 TABLET | Refills: 0 | Status: SHIPPED | OUTPATIENT
Start: 2022-07-11 | End: 2022-07-11

## 2022-07-11 RX ORDER — SERTRALINE HYDROCHLORIDE 100 MG/1
TABLET, FILM COATED ORAL
Qty: 100 TABLET | Refills: 0 | Status: SHIPPED | OUTPATIENT
Start: 2022-07-11 | End: 2022-09-26

## 2022-07-11 RX ORDER — BUPROPION HYDROCHLORIDE 150 MG/1
TABLET, EXTENDED RELEASE ORAL
Qty: 180 TABLET | Refills: 1 | Status: SHIPPED | OUTPATIENT
Start: 2022-07-11 | End: 2022-07-22

## 2022-07-11 ASSESSMENT — ANXIETY QUESTIONNAIRES
4. TROUBLE RELAXING: NEARLY EVERY DAY
7. FEELING AFRAID AS IF SOMETHING AWFUL MIGHT HAPPEN: SEVERAL DAYS
2. NOT BEING ABLE TO STOP OR CONTROL WORRYING: MORE THAN HALF THE DAYS
6. BECOMING EASILY ANNOYED OR IRRITABLE: NOT AT ALL
GAD7 TOTAL SCORE: 10
1. FEELING NERVOUS, ANXIOUS, OR ON EDGE: MORE THAN HALF THE DAYS
3. WORRYING TOO MUCH ABOUT DIFFERENT THINGS: SEVERAL DAYS
5. BEING SO RESTLESS THAT IT IS HARD TO SIT STILL: SEVERAL DAYS

## 2022-07-11 ASSESSMENT — PATIENT HEALTH QUESTIONNAIRE - PHQ9
5. POOR APPETITE OR OVEREATING: 1 - SEVERAL DAYS
CLINICAL INTERPRETATION OF PHQ2 SCORE: 2
SUM OF ALL RESPONSES TO PHQ QUESTIONS 1-9: 6

## 2022-07-11 ASSESSMENT — FIBROSIS 4 INDEX: FIB4 SCORE: 0.59

## 2022-07-11 NOTE — PATIENT INSTRUCTIONS
Taper off zoloft, decrease 25mg every 2 weeks until you get to 25mg/day and then go down to 12.5 for two weeks and then discontinue.     Start buproprion with 150mg/day x 3 days, if tolerate, go up to 150mg twice a day.

## 2022-07-11 NOTE — PROGRESS NOTES
Date of Service:  7/11/22    CC: anxiety followup    HPI:  Baylee Ceballos is a 27 y.o. female with a past medical history allergies, exercise-induced asthma, psoriasis, mood disorder, IUD for contraception (last placed in March 2021, effective for 3 years), here today for followup on anxiety/depression.    Mood disorder- started on Zoloft in March 2022 by Dr. Madsen. Zoloft helping with her depression. Denies any suicidal or homicidal ideation. Not helping with her anxiety. Hydroxyzine PRN prescribed 5 weeks ago also no helping with her anxiety. She still having about 2-3 times of panic attack a month. Unknown trigger, does worry about laundry/kitchen, etc, but does note partially in relation father of her child. She is having trouble falling asleep, wake up around 5-6am, get about 6-8 hour of sleep on average, and would wake up feeling anxious. Have tried yoga, meditation, exercising, jijitsu, in past with minimal improvement for her anxiety. She also notes since starting the zoloft she and her partner now note decreased sex drive and inability to orgasm, as well as weight gain despite exercising. Patient following with a mental health therapist once every couple of weeks, which has been helping.     Review of systems:  Review of Systems   Constitutional: Negative for chills and fever.   HENT: Negative.    Eyes: Negative.    Respiratory: Negative.    Cardiovascular: Negative.    Gastrointestinal: Negative.    Genitourinary: Negative.    Musculoskeletal: Negative.    Skin: Negative.    Neurological: Negative.    Psychiatric/Behavioral: Positive for depression. Negative for hallucinations and suicidal ideas. The patient is nervous/anxious.       Past Medical History:  Patient Active Problem List    Diagnosis Date Noted   • Skin rash 06/06/2022   • Generalized anxiety disorder 05/03/2022   • Exercise-induced asthma 01/17/2022   • Allergies 01/17/2022   • Mood disorder (HCC) 01/17/2022   • Psoriasis 01/17/2022      Past Surgical History:    has no past surgical history on file.    Medications:  Current Outpatient Medications   Medication Sig Dispense Refill   • sertraline (ZOLOFT) 100 MG Tab Start with 75mg/day x 2 weeks, taper 25mg/day every 2 weeks for two weeks until 25mg/day then 12.5mg.day for two weeks, then discontinue. 100 Tablet 0   • buPROPion SR (WELLBUTRIN-SR) 150 MG TABLET SR 12 HR sustained-release tablet Start with 150mg daily in the morning, if tolerated, after 3 days, may increase to target dose of 150mg twice daily. 180 Tablet 1   • tretinoin (RETIN-A) 0.025 % cream AAA face QHS for acne. Stop if pregnant or planning pregnancy 45 g 3   • levonorgestrel (MIRENA, 52 MG,) 20 MCG/DAY IUD Mirena 20 mcg/24 hours (7 yrs) 52 mg intrauterine device   Take 1 device by intrauterine route.     • hydrOXYzine HCl (ATARAX) 25 MG Tab Take 1 Tablet by mouth 1 time a day as needed for Anxiety. 15 Tablet 0   • cetirizine (ZYRTEC) 10 MG Tab Take 20 mg by mouth 2 times a day.     • EPINEPHrine 0.15 MG/0.15ML Solution Auto-injector Inject 0.15 mg into the shoulder, thigh, or buttocks as needed. Inject 0.15 mL into the thigh one time for 1 dose. 1 Each 2   • albuterol 108 (90 Base) MCG/ACT Aero Soln inhalation aerosol albuterol sulfate HFA 90 mcg/actuation aerosol inhaler       No current facility-administered medications for this visit.       Allergies:  No Known Allergies    Family History:   family history is not on file.     Social History:    Social History     Tobacco Use   • Smoking status: Never Smoker   • Smokeless tobacco: Never Used   Vaping Use   • Vaping Use: Never used   Substance Use Topics   • Alcohol use: Not Currently   • Drug use: Never     Physical Exam:  Vitals:    07/11/22 0928   BP: (!) 96/61   Pulse: (!) 50   Temp: 37 °C (98.6 °F)   SpO2: 99%     Body mass index is 24.13 kg/m².  Physical Exam  Vitals reviewed.   Constitutional:       General: She is not in acute distress.     Appearance: Normal  appearance.   HENT:      Head: Normocephalic and atraumatic.      Right Ear: External ear normal.      Left Ear: External ear normal.      Nose: Nose normal.   Eyes:      General: No scleral icterus.     Extraocular Movements: Extraocular movements intact.   Cardiovascular:      Rate and Rhythm: Normal rate and regular rhythm.      Heart sounds: Normal heart sounds. No murmur heard.  Pulmonary:      Effort: No respiratory distress.      Breath sounds: Normal breath sounds.   Abdominal:      General: There is no distension.      Palpations: Abdomen is soft.   Musculoskeletal:      Right lower leg: No edema.      Left lower leg: No edema.   Skin:     General: Skin is warm and dry.      Findings: No lesion or rash.   Neurological:      General: No focal deficit present.      Mental Status: She is alert and oriented to person, place, and time.   Psychiatric:         Mood and Affect: Mood normal.         Behavior: Behavior normal.         Thought Content: Thought content normal.       Assessment/Plan:  1. Mood disorder (HCC)  - sertraline (ZOLOFT) 100 MG Tab; Start with 75mg/day x 2 weeks, taper 25mg/day every 2 weeks for two weeks until 25mg/day then 12.5mg.day for two weeks, then discontinue.  Dispense: 100 Tablet; Refill: 0  - buPROPion SR (WELLBUTRIN-SR) 150 MG TABLET SR 12 HR sustained-release tablet; Start with 150mg daily in the morning, if tolerated, after 3 days, may increase to target dose of 150mg twice daily.  Dispense: 180 Tablet; Refill: 1    Pt notes zoloft helping with depression but not anxiety. Undesired side effects include weight gain and decreased libido.   - PHQ 9 score today 6, MARY 7 score today 10 (9 in 03/2022)  - Will taper off Zoloft over 8 weeks.   - transition to bupropion for mood  - followup in 2 months    2. Generalized anxiety disorder  - sertraline (ZOLOFT) 100 MG Tab; Start with 75mg/day x 2 weeks, taper 25mg/day every 2 weeks for two weeks until 25mg/day then 12.5mg.day for two weeks,  then discontinue.  Dispense: 100 Tablet; Refill: 0  - buPROPion SR (WELLBUTRIN-SR) 150 MG TABLET SR 12 HR sustained-release tablet; Start with 150mg daily in the morning, if tolerated, after 3 days, may increase to target dose of 150mg twice daily.  Dispense: 180 Tablet; Refill: 1     - zoloft and hydroxyzine not helping with anxiety.   - will transition from zoloft to bupropion   - followup in 2 months      All imaging results and lab results and consult notes are reviewed at this visit.  Followup: Return in about 2 months (around 9/11/2022).    Sola Resendiz, DO  Internal Medicine PGY-2

## 2022-07-12 PROBLEM — Z00.00 HEALTHCARE MAINTENANCE: Status: RESOLVED | Noted: 2022-01-17 | Resolved: 2022-07-12

## 2022-07-12 ASSESSMENT — ENCOUNTER SYMPTOMS
GASTROINTESTINAL NEGATIVE: 1
CARDIOVASCULAR NEGATIVE: 1
RESPIRATORY NEGATIVE: 1
NERVOUS/ANXIOUS: 1
DEPRESSION: 1
FEVER: 0
MUSCULOSKELETAL NEGATIVE: 1
EYES NEGATIVE: 1
CHILLS: 0
NEUROLOGICAL NEGATIVE: 1
HALLUCINATIONS: 0

## 2022-07-14 ENCOUNTER — OFFICE VISIT (OUTPATIENT)
Dept: BEHAVIORAL HEALTH | Facility: CLINIC | Age: 27
End: 2022-07-14
Payer: COMMERCIAL

## 2022-07-14 DIAGNOSIS — F41.1 GENERALIZED ANXIETY DISORDER: ICD-10-CM

## 2022-07-14 PROCEDURE — 90837 PSYTX W PT 60 MINUTES: CPT | Performed by: MARRIAGE & FAMILY THERAPIST

## 2022-07-15 NOTE — PROGRESS NOTES
[facility]   Therapy Progress Note        Name: Baylee Ceballos  MRN: [id number]  : 1995  Age: 27 y.o.  Date of assessment: 2022  PCP: Sola Resendiz D.O.  Persons in attendance: Patient  Total session time: 54 minutes      Topics addressed in psychotherapy include: Met the patient via zoom for an individual counseling session.  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  She feels that she has a full schedule in caring for her daughter and her .  Worked with the patient on understanding decision-making skills as part of the stimulus and response cycle.  Worked with patient on developing her own interests.      This dictation has been created using voice recognition software and/or scribes. The accuracy of the dictation is limited by the abilities of the software and the expertise of the scribes. I expect there may be some errors of grammar and possibly content. I made every attempt to manually correct the errors within my dictation. However, errors related to voice recognition software and/or scribes may still exist and should be interpreted within the appropriate context.    Objective Observations:   Participation:Active verbal participation, Attentive and Engaged   Grooming:Casual   Cognition:Alert and Fully Oriented   Eye Contact:Good   Mood:Anxious   Affect:Flexible, Full range and Congruent with content   Thought Process:Logical and Goal-directed   Speech:Rate within normal limits and Volume within normal limits    Current Risk:   Suicide: low   Homicide: low   Self-Harm: low   Relapse: low   Safety Plan Reviewed: not applicable    Care Plan Updated: Yes    Does patient express agreement with the above plan? No     Diagnosis:  1. Generalized anxiety disorder        Referral appointment(s) scheduled? No       CHLOE Bains

## 2022-07-19 ENCOUNTER — HOSPITAL ENCOUNTER (EMERGENCY)
Facility: MEDICAL CENTER | Age: 27
End: 2022-07-19
Attending: EMERGENCY MEDICINE
Payer: COMMERCIAL

## 2022-07-19 ENCOUNTER — APPOINTMENT (OUTPATIENT)
Dept: RADIOLOGY | Facility: MEDICAL CENTER | Age: 27
End: 2022-07-19
Attending: EMERGENCY MEDICINE
Payer: COMMERCIAL

## 2022-07-19 VITALS
HEART RATE: 98 BPM | TEMPERATURE: 97.9 F | HEIGHT: 63 IN | DIASTOLIC BLOOD PRESSURE: 82 MMHG | BODY MASS INDEX: 23.09 KG/M2 | SYSTOLIC BLOOD PRESSURE: 151 MMHG | OXYGEN SATURATION: 97 % | WEIGHT: 130.29 LBS | RESPIRATION RATE: 20 BRPM

## 2022-07-19 DIAGNOSIS — R25.1 TREMORS OF NERVOUS SYSTEM: ICD-10-CM

## 2022-07-19 DIAGNOSIS — R00.0 TACHYCARDIA: ICD-10-CM

## 2022-07-19 LAB
ALBUMIN SERPL BCP-MCNC: 4.5 G/DL (ref 3.2–4.9)
ALBUMIN/GLOB SERPL: 1.6 G/DL
ALP SERPL-CCNC: 81 U/L (ref 30–99)
ALT SERPL-CCNC: 11 U/L (ref 2–50)
ANION GAP SERPL CALC-SCNC: 13 MMOL/L (ref 7–16)
AST SERPL-CCNC: 15 U/L (ref 12–45)
BASOPHILS # BLD AUTO: 0.7 % (ref 0–1.8)
BASOPHILS # BLD: 0.04 K/UL (ref 0–0.12)
BILIRUB SERPL-MCNC: 0.6 MG/DL (ref 0.1–1.5)
BUN SERPL-MCNC: 13 MG/DL (ref 8–22)
CALCIUM SERPL-MCNC: 9.1 MG/DL (ref 8.4–10.2)
CHLORIDE SERPL-SCNC: 104 MMOL/L (ref 96–112)
CO2 SERPL-SCNC: 21 MMOL/L (ref 20–33)
CREAT SERPL-MCNC: 0.77 MG/DL (ref 0.5–1.4)
EOSINOPHIL # BLD AUTO: 0.1 K/UL (ref 0–0.51)
EOSINOPHIL NFR BLD: 1.9 % (ref 0–6.9)
ERYTHROCYTE [DISTWIDTH] IN BLOOD BY AUTOMATED COUNT: 43.9 FL (ref 35.9–50)
GFR SERPLBLD CREATININE-BSD FMLA CKD-EPI: 108 ML/MIN/1.73 M 2
GLOBULIN SER CALC-MCNC: 2.8 G/DL (ref 1.9–3.5)
GLUCOSE SERPL-MCNC: 89 MG/DL (ref 65–99)
HCG SERPL QL: NEGATIVE
HCT VFR BLD AUTO: 43.6 % (ref 37–47)
HGB BLD-MCNC: 14.3 G/DL (ref 12–16)
IMM GRANULOCYTES # BLD AUTO: 0.02 K/UL (ref 0–0.11)
IMM GRANULOCYTES NFR BLD AUTO: 0.4 % (ref 0–0.9)
LYMPHOCYTES # BLD AUTO: 1.27 K/UL (ref 1–4.8)
LYMPHOCYTES NFR BLD: 23.5 % (ref 22–41)
MCH RBC QN AUTO: 29.1 PG (ref 27–33)
MCHC RBC AUTO-ENTMCNC: 32.8 G/DL (ref 33.6–35)
MCV RBC AUTO: 88.8 FL (ref 81.4–97.8)
MONOCYTES # BLD AUTO: 0.34 K/UL (ref 0–0.85)
MONOCYTES NFR BLD AUTO: 6.3 % (ref 0–13.4)
NEUTROPHILS # BLD AUTO: 3.63 K/UL (ref 2–7.15)
NEUTROPHILS NFR BLD: 67.2 % (ref 44–72)
NRBC # BLD AUTO: 0 K/UL
NRBC BLD-RTO: 0 /100 WBC
PLATELET # BLD AUTO: 261 K/UL (ref 164–446)
PMV BLD AUTO: 10.5 FL (ref 9–12.9)
POTASSIUM SERPL-SCNC: 3.3 MMOL/L (ref 3.6–5.5)
PROT SERPL-MCNC: 7.3 G/DL (ref 6–8.2)
RBC # BLD AUTO: 4.91 M/UL (ref 4.2–5.4)
SODIUM SERPL-SCNC: 138 MMOL/L (ref 135–145)
WBC # BLD AUTO: 5.4 K/UL (ref 4.8–10.8)

## 2022-07-19 PROCEDURE — 99283 EMERGENCY DEPT VISIT LOW MDM: CPT

## 2022-07-19 PROCEDURE — 71045 X-RAY EXAM CHEST 1 VIEW: CPT

## 2022-07-19 PROCEDURE — 80053 COMPREHEN METABOLIC PANEL: CPT

## 2022-07-19 PROCEDURE — 700105 HCHG RX REV CODE 258: Performed by: EMERGENCY MEDICINE

## 2022-07-19 PROCEDURE — 85025 COMPLETE CBC W/AUTO DIFF WBC: CPT

## 2022-07-19 PROCEDURE — 84703 CHORIONIC GONADOTROPIN ASSAY: CPT

## 2022-07-19 RX ORDER — SODIUM CHLORIDE 9 MG/ML
1000 INJECTION, SOLUTION INTRAVENOUS ONCE
Status: COMPLETED | OUTPATIENT
Start: 2022-07-19 | End: 2022-07-19

## 2022-07-19 RX ADMIN — SODIUM CHLORIDE 1000 ML: 9 INJECTION, SOLUTION INTRAVENOUS at 12:32

## 2022-07-19 ASSESSMENT — FIBROSIS 4 INDEX: FIB4 SCORE: 0.59

## 2022-07-19 NOTE — DISCHARGE INSTRUCTIONS
Your symptoms started we were placed on Wellbutrin, and when he started tapering the Zoloft.  The symptoms that you are having could be isolated from either the tapering of the Zoloft, Wellbutrin itself, or the combination of Wellbutrin and Zoloft.  Or could be a combination of any 1 of these.    The options are to go back on the Zoloft and completely stop Wellbutrin and talk to your doctor about going on another medication.  Want to get off the Wellbutrin taper the Zoloft and then start the Wellbutrin again.    All your symptoms do appear to be related to the medication changes.

## 2022-07-19 NOTE — ED NOTES
"Assisted w/ discharge.  Pt crying, when asked if okay, pt stated \"I'm going home.\"  Pt declined discharge VS, declined to sign discharge paperwork.  Pt ambulated from ED.    "

## 2022-07-19 NOTE — ED TRIAGE NOTES
Baylee Ceballos  27 y.o.  Chief Complaint   Patient presents with   • Dizziness   • Tachycardia     Pt ambulatory to triage with concerns for a reaction to her new medication.  Pt was started on Welbutrin 150mg on Tuesday & increased the dose to 300mg on Friday.  Pt is experiencing shakiness, diaphoretic, high heart rate and dizziness.

## 2022-07-19 NOTE — ED PROVIDER NOTES
ED Provider  Scribed for Shai Palacio D.O. by Estefania Matos. 7/19/2022  12:07 PM    Means of arrival:Walk-in  History obtained from:Patient  History limited by: None    CHIEF COMPLAINT  Chief Complaint   Patient presents with    Dizziness    Tachycardia       HPI  Baylee Traci Ceballos is a 27 y.o. female who presents to the Emergency Department for further evaluation of tachycardia onset this morning when the patient was laying in bed. She states that she was lying down this morning and started to feel her heart pound, she then looked at her FitBit which showed her heart rate was around 120. The patient denies feeling anxious at this time. She states she has been on Zoloft, but is starting a new medication since it was giving her diarrhea and a low sex drive. She states she started taking wellbutrin one week ago and 4 days ago she increased her dosage. She is decreasing her Zofran simultaneously. The patient states that 4 days ago, she started having associated symptoms of sweating, chills, shivers, hand trembling, nausea, diarrhea, and increased heart rate. The patient denies any fevers, dysuria, cough, chest pain, rhinorrhea,congestion, decreased fluid and nutrition intake. She notes that she almost passed out while doing KO-SU a few days ago. The patient states she thinks her symptoms are due to her new medication. She notes she has never taken Wellbutrin before. She denies any other medical issues. The patient states she has not been able to get in contact with her doctor about her new medication since the symptoms began. The patient denies using any drugs or alcohol. She states she has an IUD so does not suspect pregnancy. The patient states she is feeling anxious currently in the ED.       REVIEW OF SYSTEMS  See HPI for further details. All other systems are negative.     PAST MEDICAL HISTORY   has a past medical history of Generalized anxiety disorder (5/3/2022), Psoriasis (1/17/2022), and Urticaria  "(12/26/2021).    SOCIAL HISTORY  Social History     Tobacco Use    Smoking status: Never Smoker    Smokeless tobacco: Never Used   Vaping Use    Vaping Use: Never used   Substance and Sexual Activity    Alcohol use: Not Currently    Drug use: Yes     Comment: edibles       SURGICAL HISTORY  patient denies any surgical history    CURRENT MEDICATIONS  Home Medications    **Home medications have not yet been reviewed for this encounter**         ALLERGIES  No Known Allergies    PHYSICAL EXAM  VITAL SIGNS: BP (!) 144/76   Pulse 77   Temp 36.4 °C (97.5 °F) (Temporal)   Resp (!) 22   Ht 1.6 m (5' 3\")   Wt 59.1 kg (130 lb 4.7 oz)   SpO2 97%   BMI 23.08 kg/m²   Constitutional: Alert in no apparent distress.  HENT: No signs of trauma, mucous membranes are moist  Eyes: Conjunctiva normal, Non-icteric.   Neck: Normal range of motion, No tenderness, Supple.  Lymphatic: No lymphadenopathy noted.   Cardiovascular: Regular rate and rhythm, no murmurs.   Thorax & Lungs: Normal breath sounds, No respiratory distress, No wheezing, No chest tenderness.   Abdomen: Bowel sounds normal, Soft, No tenderness, No masses, No pulsatile masses. No peritoneal signs.  Skin: Warm, Dry, normal color.   Back: No bony tenderness, No CVA tenderness.   Extremities: No edema, No tenderness, No cyanosis  Musculoskeletal: Good range of motion in all major joints. No tenderness to palpation or major deformities noted.   Neurologic: Alert and oriented x4,  Normal sensory function, No focal deficits noted, Fine tremors of the hands   Psychiatric: Affect normal, Judgment normal, Mood normal.           DIAGNOSTIC STUDIES / PROCEDURES    LABS  Results for orders placed or performed during the hospital encounter of 07/19/22   CBC WITH DIFFERENTIAL   Result Value Ref Range    WBC 5.4 4.8 - 10.8 K/uL    RBC 4.91 4.20 - 5.40 M/uL    Hemoglobin 14.3 12.0 - 16.0 g/dL    Hematocrit 43.6 37.0 - 47.0 %    MCV 88.8 81.4 - 97.8 fL    MCH 29.1 27.0 - 33.0 pg    " MCHC 32.8 (L) 33.6 - 35.0 g/dL    RDW 43.9 35.9 - 50.0 fL    Platelet Count 261 164 - 446 K/uL    MPV 10.5 9.0 - 12.9 fL    Neutrophils-Polys 67.20 44.00 - 72.00 %    Lymphocytes 23.50 22.00 - 41.00 %    Monocytes 6.30 0.00 - 13.40 %    Eosinophils 1.90 0.00 - 6.90 %    Basophils 0.70 0.00 - 1.80 %    Immature Granulocytes 0.40 0.00 - 0.90 %    Nucleated RBC 0.00 /100 WBC    Neutrophils (Absolute) 3.63 2.00 - 7.15 K/uL    Lymphs (Absolute) 1.27 1.00 - 4.80 K/uL    Monos (Absolute) 0.34 0.00 - 0.85 K/uL    Eos (Absolute) 0.10 0.00 - 0.51 K/uL    Baso (Absolute) 0.04 0.00 - 0.12 K/uL    Immature Granulocytes (abs) 0.02 0.00 - 0.11 K/uL    NRBC (Absolute) 0.00 K/uL   COMP METABOLIC PANEL   Result Value Ref Range    Sodium 138 135 - 145 mmol/L    Potassium 3.3 (L) 3.6 - 5.5 mmol/L    Chloride 104 96 - 112 mmol/L    Co2 21 20 - 33 mmol/L    Anion Gap 13.0 7.0 - 16.0    Glucose 89 65 - 99 mg/dL    Bun 13 8 - 22 mg/dL    Creatinine 0.77 0.50 - 1.40 mg/dL    Calcium 9.1 8.4 - 10.2 mg/dL    AST(SGOT) 15 12 - 45 U/L    ALT(SGPT) 11 2 - 50 U/L    Alkaline Phosphatase 81 30 - 99 U/L    Total Bilirubin 0.6 0.1 - 1.5 mg/dL    Albumin 4.5 3.2 - 4.9 g/dL    Total Protein 7.3 6.0 - 8.2 g/dL    Globulin 2.8 1.9 - 3.5 g/dL    A-G Ratio 1.6 g/dL   HCG QUAL SERUM   Result Value Ref Range    Beta-Hcg Qualitative Serum Negative Negative   ESTIMATED GFR   Result Value Ref Range    GFR (CKD-EPI) 108 >60 mL/min/1.73 m 2      All labs reviewed by me.    RADIOLOGY  DX-CHEST-PORTABLE (1 VIEW)   Final Result      No evidence of acute cardiopulmonary process.        The radiologist's interpretations of all radiological studies have been reviewed by me.    Films have been independently by me      COURSE  Pertinent Labs & Imaging studies reviewed. (See chart for details)        12:07 PM - Patient seen and examined at bedside. Discussed plan of care and informed the patient that we will order labs, a urinalysis, and fluids. Patient verbalizes  understanding and agreement to this plan of care. pta to room hr was 80 when speaking up to 107 wo exertion. The patient will be resuscitated with 1L NS IV. Ordered for DX-Chest Portable, CBC with Differential, CMP, UA Culture if Indicated, and HCG Qual Serum to evaluate her symptoms.     1:39 PM Patient was reevaluated at bedside. Discussed lab  and radiology  results with the patient and informed them that this could be related to getting off the Zoloft or the new medication. I suspect it is due to the medications, but I cannot say with certainty which one. I discussed with the patient that the first step would be to go off the Wellbutrin after going off the Zoloft, but the patient states she needs to be on antidepressants because she is suicidal. The patient is crying at the moment. She states she sees a PCP and a psychiatrist, but her PCP is her prescriber. She states she also sees a therapist, but her psychiatrist doesn't have any appointments for 6 months.  She states she is supposed to be off the Zoloft completely in 8 weeks. I informed her that since she does not want to get off her medications, I think she should get back on the Zoloft and stop the Wellbutrin. Patient states she wants to go home. I informed the patient of my plan for discharge. Patient verbalizes understanding and agreement to this plan of care.      MEDICAL DECISION MAKING  This is a 27 y.o. female who presents with hand tremors, tachycardia, episodes of sweating.  I have concerns for possible long-term illness, fevers.  Her white blood cell count is normal, chest x-ray is normal.  She has intermittent episodes of tachycardia seems to be worse when she gets anxious when she is talking to me.    She recently had her medications changed.  She has been on Zoloft for 6 months has been having side effects from that so they are tapering the Zoloft and she was discharged on Wellbutrin and Wellbutrin was just increased on Friday.    Since then she  has been having tremors, sweats.    Her lab test shows no electrolyte imbalance, there is no signs of infection or sepsis.  Chest x-ray is normal.  She has no cardiac arrhythmia except sinus tachycardia.    I suspect that the symptoms are related to 1 Zoloft withdrawal, 2. Wellbutrin as a new medication 3.  The combination of both of these are both these medications the same time.    I discussed the option of the patient, she can get off Wellbutrin while the Zoloft taper is occurring then go back on the Wellbutrin at that time, but she has concerns because while she was off of the Zoloft and antidepressant she had thoughts of harming her self and significant problems so she has to be on medications.    The other option is to stop Wellbutrin and stay back on the Zoloft until appropriate change her medication can be reviewed by her primary prescribing doctor.    The patient is upset that this medication changes needed, I gave her the options of what she can do for these medication changes and she has not made up her mind which one from try.  She is not happy with either of these options.  This time I do not have any other medications or any way to counteract the symptoms that she is having.    She is stable for discharge home to follow-up with her primary prescribing physician.       The patient will return for new or worsening symptoms and is stable at the time of discharge.    The patient is referred to a primary physician for blood pressure management, diabetic screening, and for all other preventative health concerns.      DISPOSITION:  Patient will be discharged home in stable condition.    FOLLOW UP:  Sola Resendiz D.O.  [address] [facility] [address]  [phone]    In 3 days    FINAL IMPRESSION  1. Tremors of nervous system    2. Tachycardia         Estefania AHN), am scribing for, and in the presence of, Shai Palacio D.O..    Electronically signed by: Estefania Bautista), 7/19/2022    JIMY  Shai Palacio D.O. personally performed the services described in this documentation, as scribed by Estefania Matos in my presence, and it is both accurate and complete.    The note accurately reflects work and decisions made by me.  Shai Palacio D.O.  7/19/2022  5:51 PM

## 2022-07-19 NOTE — ED NOTES
"Pt. Reports transitioning off zoloft, onto wellbutrin approx 1 week ago, then over the past couple days doubling wellbutrin dose from 150 to 300mg under the direction of a doctor. Pt. Reports over the past week, feeling shaky, nauseous, having a near syncopal episode, and also \"feeling that my heart rate is fast. It's not fluttery, just fast\".   "

## 2022-07-22 ENCOUNTER — TELEPHONE (OUTPATIENT)
Dept: INTERNAL MEDICINE | Facility: OTHER | Age: 27
End: 2022-07-22
Payer: COMMERCIAL

## 2022-07-22 DIAGNOSIS — F41.1 GENERALIZED ANXIETY DISORDER: ICD-10-CM

## 2022-07-22 DIAGNOSIS — F39 MOOD DISORDER (HCC): ICD-10-CM

## 2022-07-22 RX ORDER — BUPROPION HYDROCHLORIDE 150 MG/1
150 TABLET ORAL EVERY MORNING
Qty: 90 TABLET | Refills: 0 | Status: SHIPPED | OUTPATIENT
Start: 2022-07-22 | End: 2022-09-26 | Stop reason: SDUPTHER

## 2022-07-22 NOTE — TELEPHONE ENCOUNTER
Called patient and patient states she took wellbutrin and was on the increased dose for 4 days when she experienced symptoms of tremors, sweating, chills, nausea, and tachycardia, prompting her to initially go to an urgent care for evaluation but was referred to the ED. Reviewed recent ED note, pt was discharged home. Patient states she currently is on the tapered Zoloft 75mg dose and not taking the wellbutrin, still having low libido and nausea as she had when she was only on zoloft. Discussed the possible side effects of insomnia, tremor, agitation, headache, dizziness, nausea, sweating, when starting buproprion but expect most of these side effects to be immediate but often go away with time. Discussed option of switching over from the SR to extended release formulation to decrease side effects. Patient willing to trial the XR formulation. Will prescribe buproprion XR 150mg daily, and followup in 4-6 weeks and titrate if needed, as pt tapers off her zoloft.

## 2022-07-28 ENCOUNTER — OFFICE VISIT (OUTPATIENT)
Dept: BEHAVIORAL HEALTH | Facility: CLINIC | Age: 27
End: 2022-07-28
Payer: COMMERCIAL

## 2022-07-28 DIAGNOSIS — F41.1 GENERALIZED ANXIETY DISORDER: ICD-10-CM

## 2022-07-28 PROCEDURE — 90837 PSYTX W PT 60 MINUTES: CPT | Performed by: MARRIAGE & FAMILY THERAPIST

## 2022-07-28 NOTE — PROGRESS NOTES
[facility]   Therapy Progress Note        Name: Baylee Ceballos  MRN: [id number]  : 1995  Age: 27 y.o.  Date of assessment: 2022  PCP: Sola Resendiz D.O.  Persons in attendance: Patient  Total session time: 55 minutes      Topics addressed in psychotherapy include: Met with the patient in office for an individual counseling session.  The patient discussed working on some outside interests beyond her child and her child’s interests.  Discussed with the patient things that she enjoyed in the past and would show would take to restart those interests.  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  If the patient appears to be making good progress.      This dictation has been created using voice recognition software and/or scribes. The accuracy of the dictation is limited by the abilities of the software and the expertise of the scribes. I expect there may be some errors of grammar and possibly content. I made every attempt to manually correct the errors within my dictation. However, errors related to voice recognition software and/or scribes may still exist and should be interpreted within the appropriate context.    Objective Observations:   Participation:Active verbal participation, Attentive and Engaged   Grooming:Casual   Cognition:Alert and Fully Oriented   Eye Contact:Good   Mood:Anxious   Affect:Flexible and Full range   Thought Process:Logical and Goal-directed   Speech:Rate within normal limits and Volume within normal limits    Current Risk:   Suicide: low   Homicide: low   Self-Harm: low   Relapse: low   Safety Plan Reviewed: not applicable    Care Plan Updated: No    Does patient express agreement with the above plan? Yes     Diagnosis:  1. Generalized anxiety disorder        Referral appointment(s) scheduled? No       CHLOE Bains

## 2022-08-15 ENCOUNTER — OFFICE VISIT (OUTPATIENT)
Dept: BEHAVIORAL HEALTH | Facility: CLINIC | Age: 27
End: 2022-08-15
Payer: COMMERCIAL

## 2022-08-15 DIAGNOSIS — F41.1 GENERALIZED ANXIETY DISORDER: ICD-10-CM

## 2022-08-15 PROCEDURE — 90837 PSYTX W PT 60 MINUTES: CPT | Performed by: MARRIAGE & FAMILY THERAPIST

## 2022-08-15 NOTE — PROGRESS NOTES
[facility]   Therapy Progress Note        Name: Baylee Ceballos  MRN: [id number]  : 1995  Age: 27 y.o.  Date of assessment: 8/15/2022  PCP: Sola Resendiz D.O.  Persons in attendance: Patient  Total session time: 58 minutes      Topics addressed in psychotherapy include: Met with the patient in office for an individual counseling session.  The patient reported that her daughter is now back in school and they have returned from her brother's wedding.  Patient reported that she has developed a new friendship with someone from her [employer] class.  Discussed various parenting styles and reaffirmed the patients views as she works from a “whole brain child” model.  Worked with the patient on relieving anxiety through providing supportive psychotherapy.    This dictation has been created using voice recognition software and/or scribes. The accuracy of the dictation is limited by the abilities of the software and the expertise of the scribes. I expect there may be some errors of grammar and possibly content. I made every attempt to manually correct the errors within my dictation. However, errors related to voice recognition software and/or scribes may still exist and should be interpreted within the appropriate context.    Objective Observations:   Participation:Active verbal participation, Attentive, and Engaged   Grooming:Casual   Cognition:Alert and Fully Oriented   Eye Contact:Good   Mood:Euthymic   Affect:Flexible and Full range   Thought Process:Logical and Goal-directed   Speech:Rate within normal limits and Volume within normal limits    Current Risk:   Suicide: low   Homicide: low   Self-Harm: low   Relapse: low   Safety Plan Reviewed: not applicable    Care Plan Updated: No    Does patient express agreement with the above plan? Yes     Diagnosis:  1. Generalized anxiety disorder        Referral appointment(s) scheduled? No       CHLOE Bains

## 2022-09-01 ENCOUNTER — APPOINTMENT (OUTPATIENT)
Dept: DERMATOLOGY | Facility: IMAGING CENTER | Age: 27
End: 2022-09-01
Payer: COMMERCIAL

## 2022-09-02 ENCOUNTER — OFFICE VISIT (OUTPATIENT)
Dept: BEHAVIORAL HEALTH | Facility: CLINIC | Age: 27
End: 2022-09-02
Payer: COMMERCIAL

## 2022-09-02 DIAGNOSIS — F39 MOOD DISORDER (HCC): ICD-10-CM

## 2022-09-02 DIAGNOSIS — F41.1 GENERALIZED ANXIETY DISORDER: ICD-10-CM

## 2022-09-02 PROCEDURE — 90837 PSYTX W PT 60 MINUTES: CPT | Performed by: MARRIAGE & FAMILY THERAPIST

## 2022-09-02 NOTE — PROGRESS NOTES
[facility]   Therapy Progress Note        Name: Baylee Ceballos  MRN: [id number]  : 1995  Age: 27 y.o.  Date of assessment: 2022  PCP: Sola Resendiz D.O.  Persons in attendance: Patient  Total session time: 57 minutes      Topics addressed in psychotherapy include: Met with the patient in office for an individual counseling session.  The patient discussed that her mother and step dad have .  Worked with the patient on expressing her feelings regarding that relationship.  The patient also came in wearing a boot due to a broken toe.  This hampers her [employer] class and limits interactions with a new friend.  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  Discuss how that was giving of herself however did not allow much growth.    This dictation has been created using voice recognition software and/or scribes. The accuracy of the dictation is limited by the abilities of the software and the expertise of the scribes. I expect there may be some errors of grammar and possibly content. I made every attempt to manually correct the errors within my dictation. However, errors related to voice recognition software and/or scribes may still exist and should be interpreted within the appropriate context.    Objective Observations:   Participation:Active verbal participation, Attentive, and Engaged   Grooming:Casual   Cognition:Alert and Fully Oriented   Eye Contact:Good   Mood:Anxious   Affect:Flexible and Full range   Thought Process:Logical and Goal-directed   Speech:Rate within normal limits and Volume within normal limits    Current Risk:   Suicide: low   Homicide: low   Self-Harm: low   Relapse: low   Safety Plan Reviewed: yes    Care Plan Updated: No    Does patient express agreement with the above plan? Yes     Diagnosis:  1. Generalized anxiety disorder    2. Mood disorder (HCC)        Referral appointment(s) scheduled? No       Roch  VAISHALI Sanford.

## 2022-09-03 DIAGNOSIS — F41.1 GENERALIZED ANXIETY DISORDER: ICD-10-CM

## 2022-09-03 DIAGNOSIS — F39 MOOD DISORDER (HCC): ICD-10-CM

## 2022-09-06 RX ORDER — SERTRALINE HYDROCHLORIDE 25 MG/1
TABLET, FILM COATED ORAL
Qty: 91 TABLET | OUTPATIENT
Start: 2022-09-06

## 2022-09-06 NOTE — TELEPHONE ENCOUNTER
Last seen: 07/11/22 by Dr. Resendiz  Next appt: 09/26/22 with Dr. Resendiz    Was the patient seen in the last year in this department? Yes   Does patient have an active prescription for medications requested? No   Received Request Via: Pharmacy

## 2022-09-16 ENCOUNTER — OFFICE VISIT (OUTPATIENT)
Dept: BEHAVIORAL HEALTH | Facility: CLINIC | Age: 27
End: 2022-09-16
Payer: COMMERCIAL

## 2022-09-16 DIAGNOSIS — F41.1 GENERALIZED ANXIETY DISORDER: ICD-10-CM

## 2022-09-16 DIAGNOSIS — F39 MOOD DISORDER (HCC): ICD-10-CM

## 2022-09-16 PROCEDURE — 90837 PSYTX W PT 60 MINUTES: CPT | Performed by: MARRIAGE & FAMILY THERAPIST

## 2022-09-16 NOTE — PROGRESS NOTES
[facility]   Therapy Progress Note        Name: Baylee Ceballos  MRN: [id number]  : 1995  Age: 27 y.o.  Date of assessment: 2022  PCP: Sola Resendiz D.O.  Persons in attendance: Patient  Total session time: 55 minutes      Topics addressed in psychotherapy include: Met with the patient in office for an individual counseling session.  The patient discussed that her foot is continuing to heal however she is starting to return to [facility].  The patient discussed several activities which she is currently engaged, including: having a piece in art [employer], taking classes at [employer], and continuing to expand her horizons.  The patient discussed that she is feeling that she has had attention deficit hyperactivity disorder throughout her life and realizing it now when working with her seven year old daughter.  The patient feels that had times she is paralyzed by the amount of choices and responsibilities available.  Worked with the patient on developing a decision journal.    This dictation has been created using voice recognition software and/or scribes. The accuracy of the dictation is limited by the abilities of the software and the expertise of the scribes. I expect there may be some errors of grammar and possibly content. I made every attempt to manually correct the errors within my dictation. However, errors related to voice recognition software and/or scribes may still exist and should be interpreted within the appropriate context.    Objective Observations:   Participation:Active verbal participation, Attentive, and Engaged   Grooming:Casual   Cognition:Alert and Fully Oriented   Eye Contact:Good   Mood:Euthymic   Affect:Flexible and Full range   Thought Process:Logical and Goal-directed   Speech:Rate within normal limits and Volume within normal limits    Current Risk:   Suicide: low   Homicide: low   Self-Harm: low   Relapse: low   Safety Plan Reviewed: not applicable    Care Plan Updated:  No    Does patient express agreement with the above plan? Yes     Diagnosis:  1. Generalized anxiety disorder    2. Mood disorder (HCC)        Referral appointment(s) scheduled? No       VAISHALI Bains.

## 2022-09-26 ENCOUNTER — OFFICE VISIT (OUTPATIENT)
Dept: INTERNAL MEDICINE | Facility: OTHER | Age: 27
End: 2022-09-26
Payer: COMMERCIAL

## 2022-09-26 VITALS
DIASTOLIC BLOOD PRESSURE: 73 MMHG | TEMPERATURE: 98.1 F | OXYGEN SATURATION: 100 % | BODY MASS INDEX: 24.41 KG/M2 | WEIGHT: 137.8 LBS | HEIGHT: 63 IN | SYSTOLIC BLOOD PRESSURE: 107 MMHG | HEART RATE: 60 BPM

## 2022-09-26 DIAGNOSIS — Z23 FLU VACCINE NEED: ICD-10-CM

## 2022-09-26 DIAGNOSIS — F41.1 GENERALIZED ANXIETY DISORDER: ICD-10-CM

## 2022-09-26 DIAGNOSIS — F39 MOOD DISORDER (HCC): ICD-10-CM

## 2022-09-26 PROBLEM — R21 SKIN RASH: Status: RESOLVED | Noted: 2022-06-06 | Resolved: 2022-09-26

## 2022-09-26 PROCEDURE — 90471 IMMUNIZATION ADMIN: CPT

## 2022-09-26 PROCEDURE — 99213 OFFICE O/P EST LOW 20 MIN: CPT | Mod: 25,GE

## 2022-09-26 PROCEDURE — 90686 IIV4 VACC NO PRSV 0.5 ML IM: CPT

## 2022-09-26 RX ORDER — BUPROPION HYDROCHLORIDE 300 MG/1
300 TABLET ORAL EVERY MORNING
Qty: 90 TABLET | Refills: 1 | Status: SHIPPED | OUTPATIENT
Start: 2022-09-26 | End: 2023-06-22

## 2022-09-26 RX ORDER — LEVONORGESTREL 52 MG/1
INTRAUTERINE DEVICE INTRAUTERINE
COMMUNITY
End: 2023-04-03

## 2022-09-26 ASSESSMENT — ENCOUNTER SYMPTOMS
PALPITATIONS: 0
CHILLS: 0
CONSTIPATION: 0
SPUTUM PRODUCTION: 0
DIZZINESS: 0
ABDOMINAL PAIN: 0
NAUSEA: 0
SPEECH CHANGE: 0
FOCAL WEAKNESS: 0
SHORTNESS OF BREATH: 0
VOMITING: 0
SENSORY CHANGE: 0
DIARRHEA: 0
TREMORS: 0
COUGH: 0
HEADACHES: 1
FEVER: 0
WEIGHT LOSS: 0

## 2022-09-26 ASSESSMENT — ANXIETY QUESTIONNAIRES
GAD7 TOTAL SCORE: 8
5. BEING SO RESTLESS THAT IT IS HARD TO SIT STILL: NOT AT ALL
1. FEELING NERVOUS, ANXIOUS, OR ON EDGE: MORE THAN HALF THE DAYS
4. TROUBLE RELAXING: SEVERAL DAYS
7. FEELING AFRAID AS IF SOMETHING AWFUL MIGHT HAPPEN: NOT AT ALL
2. NOT BEING ABLE TO STOP OR CONTROL WORRYING: MORE THAN HALF THE DAYS
3. WORRYING TOO MUCH ABOUT DIFFERENT THINGS: SEVERAL DAYS
6. BECOMING EASILY ANNOYED OR IRRITABLE: MORE THAN HALF THE DAYS
IF YOU CHECKED OFF ANY PROBLEMS ON THIS QUESTIONNAIRE, HOW DIFFICULT HAVE THESE PROBLEMS MADE IT FOR YOU TO DO YOUR WORK, TAKE CARE OF THINGS AT HOME, OR GET ALONG WITH OTHER PEOPLE: NOT DIFFICULT AT ALL

## 2022-09-26 ASSESSMENT — PATIENT HEALTH QUESTIONNAIRE - PHQ9
SUM OF ALL RESPONSES TO PHQ QUESTIONS 1-9: 5
CLINICAL INTERPRETATION OF PHQ2 SCORE: 2
5. POOR APPETITE OR OVEREATING: 0 - NOT AT ALL

## 2022-09-26 ASSESSMENT — FIBROSIS 4 INDEX: FIB4 SCORE: 0.47

## 2022-09-26 NOTE — PROGRESS NOTES
Date of Service:  9/26/22    CC: 2 month followup    HPI:  Baylee Ceballos is a 27 y.o. female with a PMH of past medical history allergies, exercise-induced asthma, psoriasis, mood disorder, IUD for contraception (last placed in March 2021, effective for 3 years), here for followup on anxiety/mood disorder.     Patient was tapered off zoloft, and started on wellbutrin at last visit. Had some symptoms of tremors sweating, chills, nausea, tachycardia, prompting her to go to urgent care who referred her to ED. Symptoms likely secondary to zoloft taper and initiation of new medication wellbutrin. She was discharged home. Switched her from wellbutrin SR to ER to decrease side effects, currently taking 150mg daily in the morning, tolerating the dose and doing well, minimal side effects (does get some maybe slightly inc sweating), but denies any dizziness, nausea, tremor. She feels the wellbutrin is a better fit for her than the prior zoloft/hydroxyzine for her anxiety/depressed mood and her libido has improved since the switch. She still does get days where she feels more depressed and lacking motivation and noted anxiety level has decreased but still wake up in the middle of her sleep, having trouble staying asleep and falling back asleep due to her anxiety. Still getting occasional sleep disturbances/nightmares, but she is sleeping at least 7 hours on average a day. She continued to see a mental health therapist. No thoughts of self harm, SI, or HI.    Recently  her toe, now recovered, and will be starting back on [email or web address]. Thought perhaps this injury affected her mood as well as she could not participate in [email or web address] for the past few weeks. Planning on going back to [employer] for school this spring.     Health maintanence-   Pap smear- 03/2021  Flu shot- to be given today.     Review of systems:  Review of Systems   Constitutional:  Negative for chills, fever and weight loss.   HENT: Negative.     Respiratory:   Negative for cough, sputum production and shortness of breath.    Cardiovascular:  Negative for chest pain, palpitations and leg swelling.   Gastrointestinal:  Negative for abdominal pain, constipation, diarrhea, nausea and vomiting.   Genitourinary: Negative.    Skin: Negative.    Neurological:  Positive for headaches (baseline, occasional). Negative for dizziness, tremors, sensory change, speech change and focal weakness.      Past Medical History:  Patient Active Problem List    Diagnosis Date Noted    Skin rash 06/06/2022    Generalized anxiety disorder 05/03/2022    Exercise-induced asthma 01/17/2022    Allergies 01/17/2022    Mood disorder (HCC) 01/17/2022    Psoriasis 01/17/2022     Past Surgical History:    has no past surgical history on file.    Medications:  Current Outpatient Medications   Medication Sig Dispense Refill    buPROPion (WELLBUTRIN XL) 300 MG XL tablet Take 1 Tablet by mouth every morning. 90 Tablet 1    tretinoin (RETIN-A) 0.025 % cream AAA face QHS for acne. Stop if pregnant or planning pregnancy 45 g 3    cetirizine (ZYRTEC) 10 MG Tab Take 20 mg by mouth 2 times a day.      EPINEPHrine 0.15 MG/0.15ML Solution Auto-injector Inject 0.15 mg into the shoulder, thigh, or buttocks as needed. Inject 0.15 mL into the thigh one time for 1 dose. 1 Each 2    albuterol 108 (90 Base) MCG/ACT Aero Soln inhalation aerosol albuterol sulfate HFA 90 mcg/actuation aerosol inhaler      levonorgestrel (MIRENA, 52 MG,) 20 MCG/DAY IUD Mirena 20 mcg/24 hours (8 yrs) 52 mg intrauterine device   Take 1 device by intrauterine route.       No current facility-administered medications for this visit.       Allergies:  No Known Allergies    Family History:   family history is not on file.     Social History:    Social History     Tobacco Use    Smoking status: Never    Smokeless tobacco: Never   Vaping Use    Vaping Use: Never used   Substance Use Topics    Alcohol use: Not Currently    Drug use: Yes     Comment:  edibles     Employment: planning on going back to [employer] for school in spring    Physical Exam:  Vitals:    09/26/22 0959   BP: 107/73   Pulse: 60   Temp: 36.7 °C (98.1 °F)   SpO2: 100%     Body mass index is 24.41 kg/m².  Physical Exam  Constitutional:       General: She is not in acute distress.     Appearance: Normal appearance.   HENT:      Head: Normocephalic and atraumatic.      Right Ear: External ear normal.      Left Ear: External ear normal.   Eyes:      General: No scleral icterus.     Extraocular Movements: Extraocular movements intact.      Conjunctiva/sclera: Conjunctivae normal.   Cardiovascular:      Rate and Rhythm: Normal rate and regular rhythm.      Heart sounds: Normal heart sounds. No murmur heard.  Pulmonary:      Effort: No respiratory distress.      Breath sounds: Normal breath sounds.   Abdominal:      General: There is no distension.      Palpations: Abdomen is soft.   Musculoskeletal:         General: No swelling or tenderness.      Cervical back: Normal range of motion.      Right lower leg: No edema.      Left lower leg: No edema.   Skin:     General: Skin is warm and dry.   Neurological:      General: No focal deficit present.      Mental Status: She is alert.   Psychiatric:         Mood and Affect: Mood normal.         Behavior: Behavior normal.        Assessment/Plan:    1. Mood disorder (HCC)  2. Generalized anxiety disorder  PHQ9 score 5, MARY score 8, both improved since switched from zoloft/hydroxyzine to wellbutrin.   - tolerating medication well, still have some days feeling depressed and sleep disturbances due to anxiety,  will increase dose from 150mg to 300mg.   - buPROPion (WELLBUTRIN XL) 300 MG XL tablet; Take 1 Tablet by mouth every morning.  Dispense: 90 Tablet; Refill: 1    3. Flu vaccine need  - INFLUENZA VACCINE QUAD INJ (PF)    All imaging results and lab results and consult notes are reviewed at this visit.  Followup: Return in about 3 months (around 12/26/2022), or if  symptoms worsen or fail to improve.    Sola Resendiz, DO  Internal Medicine PGY-2

## 2022-09-30 ENCOUNTER — OFFICE VISIT (OUTPATIENT)
Dept: BEHAVIORAL HEALTH | Facility: CLINIC | Age: 27
End: 2022-09-30
Payer: COMMERCIAL

## 2022-09-30 DIAGNOSIS — F41.1 GENERALIZED ANXIETY DISORDER: ICD-10-CM

## 2022-09-30 PROCEDURE — 90837 PSYTX W PT 60 MINUTES: CPT | Performed by: MARRIAGE & FAMILY THERAPIST

## 2022-09-30 NOTE — PROGRESS NOTES
[facility]   Therapy Progress Note        Name: Baylee Ceballos  MRN: [id number]  : 1995  Age: 27 y.o.  Date of assessment: 2022  PCP: Sola Resendiz D.O.  Persons in attendance: Patient  Total session time: 55 minutes      Topics addressed in psychotherapy include: Met with the patient in office for an individual counseling session.  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  She also discussed looking at an upcoming custody tidwell over her daughter with her ex .  The patient discussed parenting styles and how they differ from her ex husbands.  Discussed with the patient trauma and the effects it has on awareness and judgment.  Patient appears to be making good progress of this time.    This dictation has been created using voice recognition software and/or scribes. The accuracy of the dictation is limited by the abilities of the software and the expertise of the scribes. I expect there may be some errors of grammar and possibly content. I made every attempt to manually correct the errors within my dictation. However, errors related to voice recognition software and/or scribes may still exist and should be interpreted within the appropriate context.    Objective Observations:   Participation:Active verbal participation, Attentive, and Engaged   Grooming:Casual   Cognition:Alert and Fully Oriented   Eye Contact:Good   Mood:Anxious   Affect:Flexible and Full range   Thought Process:Logical and Goal-directed   Speech:Rate within normal limits and Volume within normal limits    Current Risk:   Suicide: low   Homicide: low   Self-Harm: low   Relapse: low   Safety Plan Reviewed: not applicable    Care Plan Updated: No    Does patient express agreement with the above plan? No     Diagnosis:  1. Generalized anxiety disorder        Referral appointment(s) scheduled? No       CHLOE Bains

## 2022-10-10 ENCOUNTER — OFFICE VISIT (OUTPATIENT)
Dept: BEHAVIORAL HEALTH | Facility: CLINIC | Age: 27
End: 2022-10-10
Payer: COMMERCIAL

## 2022-10-10 DIAGNOSIS — F41.1 GENERALIZED ANXIETY DISORDER: ICD-10-CM

## 2022-10-10 DIAGNOSIS — F39 MOOD DISORDER (HCC): ICD-10-CM

## 2022-10-10 PROCEDURE — 90837 PSYTX W PT 60 MINUTES: CPT | Performed by: MARRIAGE & FAMILY THERAPIST

## 2022-10-31 ENCOUNTER — OFFICE VISIT (OUTPATIENT)
Dept: BEHAVIORAL HEALTH | Facility: CLINIC | Age: 27
End: 2022-10-31
Payer: COMMERCIAL

## 2022-10-31 DIAGNOSIS — F41.1 GENERALIZED ANXIETY DISORDER: ICD-10-CM

## 2022-10-31 DIAGNOSIS — F39 MOOD DISORDER (HCC): ICD-10-CM

## 2022-10-31 PROCEDURE — 90837 PSYTX W PT 60 MINUTES: CPT | Performed by: MARRIAGE & FAMILY THERAPIST

## 2022-10-31 NOTE — PROGRESS NOTES
[facility]   Therapy Progress Note        Name: Baylee Ceballos  MRN: [id number]  : 1995  Age: 27 y.o.  Date of assessment: 10/31/2022  PCP: Sola Resendiz D.O.  Persons in attendance: Patient  Total session time: 55 minutes      Topics addressed in psychotherapy include: Met with the patient in office for an individual counseling session.  The patient discussed that despite court going well and she continues to feel sad.  Worked with patient identify sadness stemming from guilt.  Processed with the patient her family relations and trauma.  Worked with the patient on “thought ladder ” and not trying to achieve the highest wrong immediately.  Provided the patient with a copy of the book “[place]”.  Discussed various family relations and how that has been affected by a trauma.    This dictation has been created using voice recognition software and/or scribes. The accuracy of the dictation is limited by the abilities of the software and the expertise of the scribes. I expect there may be some errors of grammar and possibly content. I made every attempt to manually correct the errors within my dictation. However, errors related to voice recognition software and/or scribes may still exist and should be interpreted within the appropriate context.    Objective Observations:   Participation:Active verbal participation, Attentive, and Engaged   Grooming:Casual   Cognition:Alert and Fully Oriented   Eye Contact:Good   Mood:Anxious   Affect:Congruent with content, Anxious, and Tearful   Thought Process:Logical and Goal-directed   Speech:Rate within normal limits and Volume within normal limits    Current Risk:   Suicide: low   Homicide: low   Self-Harm: low   Relapse: low   Safety Plan Reviewed: not applicable    Care Plan Updated: No    Does patient express agreement with the above plan? Yes     Diagnosis:  1. Generalized anxiety disorder    2. Mood disorder (HCC)        Referral appointment(s)  scheduled? No       VAISHALI Bains.

## 2022-11-01 NOTE — PROGRESS NOTES
[facility]   Therapy Progress Note        Name: Baylee Ceballos  MRN: [id number]  : 1995  Age: 27 y.o.  Date of assessment: 10/10/2022  PCP: Sola Resendiz D.O.  Persons in attendance: Patient  Total session time: 57 minutes      Topics addressed in psychotherapy include: Met with the patient in office for an individual counseling session.  The patient reported that she has court next week regarding custody of her daughter.  She discussed that her ex is attempting to get a greater amount of custody and feels (him) that she is not a good parent.  Discuss boundaries with her ex as well as the stimulus and response to situations.  Assisted the patient to attempt to try to look at these logically rather than emotionally.    This dictation has been created using voice recognition software and/or scribes. The accuracy of the dictation is limited by the abilities of the software and the expertise of the scribes. I expect there may be some errors of grammar and possibly content. I made every attempt to manually correct the errors within my dictation. However, errors related to voice recognition software and/or scribes may still exist and should be interpreted within the appropriate context.    Objective Observations:   Participation:Active verbal participation, Attentive, and Engaged   Grooming:Casual   Cognition:Alert and Fully Oriented   Eye Contact:Good   Mood:Anxious   Affect:Flexible and Full range   Thought Process:Logical and Goal-directed   Speech:Rate within normal limits and Volume within normal limits    Current Risk:   Suicide: low   Homicide: low   Self-Harm: low   Relapse: low   Safety Plan Reviewed: not applicable    Care Plan Updated: No    Does patient express agreement with the above plan? Yes     Diagnosis:  1. Generalized anxiety disorder    2. Mood disorder (HCC)        Referral appointment(s) scheduled? No       CHLOE Bains

## 2022-11-06 ENCOUNTER — PATIENT MESSAGE (OUTPATIENT)
Dept: INTERNAL MEDICINE | Facility: OTHER | Age: 27
End: 2022-11-06
Payer: COMMERCIAL

## 2022-11-06 DIAGNOSIS — Z30.431 FAMILY PLANNING, IUD (INTRAUTERINE DEVICE) CHECK/REINSERTION/REMOVAL: ICD-10-CM

## 2022-11-15 ENCOUNTER — OFFICE VISIT (OUTPATIENT)
Dept: BEHAVIORAL HEALTH | Facility: CLINIC | Age: 27
End: 2022-11-15
Payer: COMMERCIAL

## 2022-11-15 DIAGNOSIS — F39 MOOD DISORDER (HCC): ICD-10-CM

## 2022-11-15 DIAGNOSIS — F41.1 GENERALIZED ANXIETY DISORDER: ICD-10-CM

## 2022-11-15 PROCEDURE — 90837 PSYTX W PT 60 MINUTES: CPT | Performed by: MARRIAGE & FAMILY THERAPIST

## 2022-11-15 NOTE — PROGRESS NOTES
[facility]   Therapy Progress Note        Name: Baylee Ceballos  MRN: [id number]  : 1995  Age: 27 y.o.  Date of assessment: 11/15/2022  PCP: Sola Resendiz D.O.  Persons in attendance: Patient  Total session time: 57 minutes      Topics addressed in psychotherapy include: Met with the patient in office for an individual counseling session.  The patient discussed positive events in her life.  She reported that she feels depressed and unhappy most of the time, despite knowing that there should be no reason for these feelings.  Longer discussion ensued that she never felt that she had a childhood.  She was brought around by her mother and called “baggage”.  Worked with the patient on identifying what she can do to live a fulfilling life.    This dictation has been created using voice recognition software and/or scribes. The accuracy of the dictation is limited by the abilities of the software and the expertise of the scribes. I expect there may be some errors of grammar and possibly content. I made every attempt to manually correct the errors within my dictation. However, errors related to voice recognition software and/or scribes may still exist and should be interpreted within the appropriate context.    Objective Observations:   Participation:Active verbal participation, Attentive, and Engaged   Grooming:Casual   Cognition:Alert and Fully Oriented   Eye Contact:Good   Mood:Anxious   Affect:Flexible and Full range   Thought Process:Logical and Goal-directed   Speech:Rate within normal limits and Volume within normal limits    Current Risk:   Suicide: low   Homicide: low   Self-Harm: low   Relapse: low   Safety Plan Reviewed: not applicable    Care Plan Updated: No    Does patient express agreement with the above plan? Yes     Diagnosis:  1. Generalized anxiety disorder    2. Mood disorder (HCC)        Referral appointment(s) scheduled? No       CHLOE Bains

## 2022-11-29 ENCOUNTER — OFFICE VISIT (OUTPATIENT)
Dept: BEHAVIORAL HEALTH | Facility: CLINIC | Age: 27
End: 2022-11-29
Payer: COMMERCIAL

## 2022-11-29 DIAGNOSIS — F39 MOOD DISORDER (HCC): ICD-10-CM

## 2022-11-29 DIAGNOSIS — F41.1 GENERALIZED ANXIETY DISORDER: ICD-10-CM

## 2022-11-29 PROCEDURE — 90837 PSYTX W PT 60 MINUTES: CPT | Performed by: MARRIAGE & FAMILY THERAPIST

## 2022-11-30 NOTE — PROGRESS NOTES
[facility]   Therapy Progress Note        Name: Baylee Ceballos  MRN: [id number]  : 1995  Age: 27 y.o.  Date of assessment: 2022  PCP: Sola Resendiz D.O.  Persons in attendance: Patient  Total session time: 55 minutes      Topics addressed in psychotherapy include: Met with the patient in office for an individual counseling session.  The patient discussed that she is exploring more of her own interests including care for her [employer].  Worked with the patient in addressing her lost childhood and over compensation from that th in her own child's life.  It appears that the patient is slowly exploring her own interests in the role as a person and not as a mother or spouse.  This dictation has been created using voice recognition software and/or scribes. The accuracy of the dictation is limited by the abilities of the software and the expertise of the scribes. I expect there may be some errors of grammar and possibly content. I made every attempt to manually correct the errors within my dictation. However, errors related to voice recognition software and/or scribes may still exist and should be interpreted within the appropriate context.    Objective Observations:   Participation:Active verbal participation, Attentive, and Engaged   Grooming:Casual   Cognition:Alert and Fully Oriented   Eye Contact:Good   Mood:Anxious   Affect:Flexible and Full range   Thought Process:Logical and Goal-directed   Speech:Rate within normal limits and Volume within normal limits    Current Risk:   Suicide: low   Homicide: low   Self-Harm: low   Relapse: low   Safety Plan Reviewed: no    Care Plan Updated: No    Does patient express agreement with the above plan? Yes     Diagnosis:  1. Generalized anxiety disorder    2. Mood disorder (HCC)        Referral appointment(s) scheduled? No       CHLOE Bains

## 2022-12-19 ENCOUNTER — OFFICE VISIT (OUTPATIENT)
Dept: BEHAVIORAL HEALTH | Facility: CLINIC | Age: 27
End: 2022-12-19
Payer: COMMERCIAL

## 2022-12-19 DIAGNOSIS — F41.9 ANXIETY: ICD-10-CM

## 2022-12-19 PROCEDURE — 90837 PSYTX W PT 60 MINUTES: CPT | Performed by: MARRIAGE & FAMILY THERAPIST

## 2022-12-19 NOTE — PROGRESS NOTES
[facility]   Therapy Progress Note        Name: Baylee Ceballos  MRN: [id number]  : 1995  Age: 27 y.o.  Date of assessment: 2022  PCP: Sola Resendiz D.O.  Persons in attendance: Patient  Total session time: 55 minutes      Topics addressed in psychotherapy include: Patient reports that she continues to have difficulty with motivation. She reported that there are things to do around the house and she will just procrastinate on doing them. Discussed boundaries to completing these tasks which she reports are not that difficulty. Discussed possible feelings of being useless if all these chores are complete. Discussed feelings of being overwhelmed by the tasks. Neither of these appear to be the case. The patient discussed procrastination, difficulty in sustaining attention, difficulty in organizing tasks, being forgetful of everyday tasks and becoming overwhelmed. Discussed the possibility of adult ADHD. Provided a referral for testing.    Objective Observations:   Participation:Active verbal participation, Attentive, and Engaged   Grooming:Casual   Cognition:Alert and Fully Oriented   Eye Contact:Good   Mood:Anxious   Affect:Flexible, Full range, and Congruent with content   Thought Process:Logical and Goal-directed   Speech:Rate within normal limits and Volume within normal limits    Current Risk:   Suicide: low   Homicide: low   Self-Harm: low   Relapse: low   Safety Plan Reviewed: not applicable    Care Plan Updated: No    Does patient express agreement with the above plan? Yes     Diagnosis:  1. Anxiety        Referral appointment(s) scheduled? Yes       CHLOE Bains

## 2022-12-28 ENCOUNTER — OFFICE VISIT (OUTPATIENT)
Dept: INTERNAL MEDICINE | Facility: OTHER | Age: 27
End: 2022-12-28
Payer: COMMERCIAL

## 2022-12-28 VITALS
BODY MASS INDEX: 23.92 KG/M2 | SYSTOLIC BLOOD PRESSURE: 106 MMHG | OXYGEN SATURATION: 98 % | DIASTOLIC BLOOD PRESSURE: 72 MMHG | WEIGHT: 135 LBS | HEIGHT: 63 IN | TEMPERATURE: 97.8 F | HEART RATE: 58 BPM

## 2022-12-28 DIAGNOSIS — J45.990 EXERCISE-INDUCED ASTHMA: ICD-10-CM

## 2022-12-28 DIAGNOSIS — F39 MOOD DISORDER (HCC): ICD-10-CM

## 2022-12-28 DIAGNOSIS — Z78.9 USES BIRTH CONTROL: ICD-10-CM

## 2022-12-28 PROCEDURE — 99213 OFFICE O/P EST LOW 20 MIN: CPT | Mod: GE | Performed by: STUDENT IN AN ORGANIZED HEALTH CARE EDUCATION/TRAINING PROGRAM

## 2022-12-28 RX ORDER — CETIRIZINE HYDROCHLORIDE 10 MG/1
20 TABLET ORAL DAILY
COMMUNITY
Start: 2022-12-28

## 2022-12-28 ASSESSMENT — ENCOUNTER SYMPTOMS
NAUSEA: 0
HEMOPTYSIS: 0
PALPITATIONS: 0
CONSTIPATION: 0
BLURRED VISION: 0
DEPRESSION: 0
CHILLS: 0
VOMITING: 0
DOUBLE VISION: 0
COUGH: 0
SPUTUM PRODUCTION: 0
DIARRHEA: 0
FEVER: 0
NERVOUS/ANXIOUS: 0
BLOOD IN STOOL: 0
SHORTNESS OF BREATH: 0

## 2022-12-28 ASSESSMENT — FIBROSIS 4 INDEX: FIB4 SCORE: 0.47

## 2022-12-28 NOTE — PATIENT INSTRUCTIONS
-I ordered a vitamin D level for you. Have it done at your convenience.  -Follow up with the OBGYN and let us know if you need anything from us.  -You should enough wellbutrin to go through March.  -Follow in 15 weeks.

## 2022-12-29 NOTE — PROGRESS NOTES
"Subjective:     CC: Follow-up for anxiety.    HPI:   Patient is a 27 year old female with a PMH of exercise induced asthma, anxiety, psoriasis and seasonal allergies who presents to clinic today for a follow-up visit.    Patient denies any acute complaints since last visit. She reports that she was able to titrate her bupropion to 300mg daily with good outcomes. She reports improved mood and says that she was able to establish with a therapist, and has been having regular visits. She reports dealing with anxiety since childhood and also says that anxiety runs in her family. She was last treated with zoloft for anxiety/depression during 1257-0787 and titrated off. She denies any SI/HI at this time.    Patient reports that her asthma has been controlled since last visit and that she is only needing to take albuterol with exercise.    No other complaints at this time.    No problems updated.    Health Maintenance: Completed    ROS:  Review of Systems   Constitutional:  Negative for chills and fever.   HENT:  Negative for ear pain, hearing loss and tinnitus.    Eyes:  Negative for blurred vision and double vision.   Respiratory:  Negative for cough, hemoptysis, sputum production and shortness of breath.    Cardiovascular:  Negative for chest pain, palpitations and leg swelling.   Gastrointestinal:  Negative for blood in stool, constipation, diarrhea, melena, nausea and vomiting.   Genitourinary:  Negative for dysuria and urgency.   Skin:  Negative for itching and rash.   Psychiatric/Behavioral:  Negative for depression and suicidal ideas. The patient is not nervous/anxious.      Objective:     Exam:  /72 (BP Location: Left arm, Patient Position: Sitting, BP Cuff Size: Adult)   Pulse (!) 58   Temp 36.6 °C (97.8 °F) (Temporal)   Ht 1.6 m (5' 3\")   Wt 61.2 kg (135 lb)   SpO2 98%   BMI 23.91 kg/m²  Body mass index is 23.91 kg/m².    Physical Exam  Constitutional:       General: She is not in acute distress.     " Appearance: Normal appearance. She is normal weight. She is not ill-appearing, toxic-appearing or diaphoretic.   HENT:      Head: Normocephalic and atraumatic.      Mouth/Throat:      Mouth: Mucous membranes are moist.      Pharynx: Oropharynx is clear. No oropharyngeal exudate or posterior oropharyngeal erythema.   Eyes:      General: No scleral icterus.        Right eye: No discharge.         Left eye: No discharge.      Conjunctiva/sclera: Conjunctivae normal.   Cardiovascular:      Rate and Rhythm: Normal rate and regular rhythm.      Pulses: Normal pulses.      Heart sounds: Normal heart sounds. No murmur heard.    No friction rub. No gallop.   Pulmonary:      Effort: Pulmonary effort is normal. No respiratory distress.      Breath sounds: Normal breath sounds. No stridor. No wheezing or rhonchi.   Abdominal:      General: Abdomen is flat. Bowel sounds are normal. There is no distension.      Palpations: Abdomen is soft. There is no mass.      Tenderness: There is no abdominal tenderness.      Hernia: No hernia is present.   Musculoskeletal:         General: No swelling or tenderness.      Right lower leg: No edema.      Left lower leg: No edema.   Skin:     General: Skin is warm and dry.      Coloration: Skin is not pale.      Findings: No erythema or rash.   Neurological:      General: No focal deficit present.      Mental Status: She is alert and oriented to person, place, and time. Mental status is at baseline.         Labs: Please see results section for further information.    Assessment & Plan:   Patient is a 27 year old female with a PMH of exercise induced asthma, anxiety, psoriasis and seasonal allergies who presents to clinic today for a follow-up visit.    1. Mood disorder (HCC)  - Stable at this time.  - continue bupropion 300mg at this time.  - Continue to follow with therapist.  - TSH/Free T4 wnl in March. Will check vitamin D for further evaluation.  - VITAMIN D,25 HYDROXY (DEFICIENCY);  Future    2. Exercise-induced asthma  - Stable at this time.  - Continue albuterol regimen.    3. Uses birth control  - Patient denies any complaints regarding her mirena at this time. She has scheduled follow-up with OBGYN for removing the mirena.      I spent a total of 45 minutes with record review, exam, communication with the patient, communication with other providers, and documentation of this encounter.      Return in about 15 weeks (around 4/12/2023).

## 2023-01-03 ENCOUNTER — OFFICE VISIT (OUTPATIENT)
Dept: BEHAVIORAL HEALTH | Facility: CLINIC | Age: 28
End: 2023-01-03
Payer: COMMERCIAL

## 2023-01-03 DIAGNOSIS — F39 MOOD DISORDER (HCC): ICD-10-CM

## 2023-01-03 DIAGNOSIS — F41.1 GENERALIZED ANXIETY DISORDER: ICD-10-CM

## 2023-01-03 PROCEDURE — 90837 PSYTX W PT 60 MINUTES: CPT | Performed by: MARRIAGE & FAMILY THERAPIST

## 2023-01-03 NOTE — PROGRESS NOTES
[facility]   Therapy Progress Note        Name: Baylee Ceballos  MRN: [id number]  : 1995  Age: 27 y.o.  Date of assessment: 1/3/2023  PCP: Sola Resendiz D.O.  Persons in attendance: Patient  Total session time: 55 minutes      Topics addressed in psychotherapy include: Met with the patient in office for an individual counseling session.  The patient discussed that things are going well and they had a good holiday.  During the discussion patients stated she is feeling overwhelmed with her involvement in buying a house.  Th validated the patient's feelings regarding home purchase stress.  Patient discussed that her  missed his dental appointment today and she assumed that she had given him the wrong date six months ago when the appointment was scheduled.  The patient that spiraled into feelings of “everything” is her fault.  Worked with the patient identify multiple stressors leading to that thought.  Discussed with the patient her 's responsibility to follow up on his own appointments.  Discussed feelings of inadequacy stemming from childhood. Discussed level of problem vs level of response.    This dictation has been created using voice recognition software and/or scribes. The accuracy of the dictation is limited by the abilities of the software and the expertise of the scribes. I expect there may be some errors of grammar and possibly content. I made every attempt to manually correct the errors within my dictation. However, errors related to voice recognition software and/or scribes may still exist and should be interpreted within the appropriate context.    Objective Observations:   Participation:Active verbal participation, Attentive, and Engaged   Grooming:Casual   Cognition:Alert and Fully Oriented   Eye Contact:Good   Mood:Depressed   Affect:Flexible, Full range, and Tearful   Thought Process:Logical and Goal-directed   Speech:Rate within normal limits and Volume within normal  limits    Current Risk:   Suicide: low   Homicide: low   Self-Harm: low   Relapse: low   Safety Plan Reviewed: not applicable    Care Plan Updated: No    Does patient express agreement with the above plan? Yes     Diagnosis:  1. Generalized anxiety disorder    2. Mood disorder (HCC)        Referral appointment(s) scheduled? VAISHALI Choi.

## 2023-01-17 ENCOUNTER — APPOINTMENT (OUTPATIENT)
Dept: BEHAVIORAL HEALTH | Facility: CLINIC | Age: 28
End: 2023-01-17
Payer: COMMERCIAL

## 2023-01-31 ENCOUNTER — OFFICE VISIT (OUTPATIENT)
Dept: BEHAVIORAL HEALTH | Facility: CLINIC | Age: 28
End: 2023-01-31
Payer: COMMERCIAL

## 2023-01-31 DIAGNOSIS — F41.1 GENERALIZED ANXIETY DISORDER: ICD-10-CM

## 2023-01-31 DIAGNOSIS — F39 MOOD DISORDER (HCC): ICD-10-CM

## 2023-01-31 PROCEDURE — 90837 PSYTX W PT 60 MINUTES: CPT | Performed by: MARRIAGE & FAMILY THERAPIST

## 2023-01-31 NOTE — PROGRESS NOTES
[facility]   Therapy Progress Note        Name: Baylee Ceballos  MRN: [id number]  : 1995  Age: 27 y.o.  Date of assessment: 2023  PCP: Sola Resendiz D.O.  Persons in attendance: Patient  Total session time: 58 minutes      Topics addressed in psychotherapy include: Met with the patient in office for an individual counseling session.  The patient discussed her feelings of being dismissed or not heard when she was an adolescent and graduating high school with a ruptured appendix.  She discussed similar instances of those feelings when she had her daughter and more recently in medical appointments.  Discussed with the patient changes in her and her 's hope to get pregnant and the difference in experience with having a supportive spouse.  Patient reported that they closed on their house and will be moving soon.  Discussed managing those stressors.    This dictation has been created using voice recognition software and/or scribes. The accuracy of the dictation is limited by the abilities of the software and the expertise of the scribes. I expect there may be some errors of grammar and possibly content. I made every attempt to manually correct the errors within my dictation. However, errors related to voice recognition software and/or scribes may still exist and should be interpreted within the appropriate context.    Objective Observations:   Participation:Active verbal participation, Attentive, and Engaged   Grooming:Casual   Cognition:Alert and Fully Oriented   Eye Contact:Good   Mood:Happy   Affect:Flexible and Full range   Thought Process:Logical and Goal-directed   Speech:Rate within normal limits and Volume within normal limits    Current Risk:   Suicide: low   Homicide: low   Self-Harm: low   Relapse: low   Safety Plan Reviewed: not applicable    Care Plan Updated: No    Does patient express agreement with the above plan? Yes     Diagnosis:  1. Generalized anxiety disorder    2. Mood  disorder (HCC)        Referral appointment(s) scheduled? No       VAISHALI Bains.

## 2023-02-14 ENCOUNTER — OFFICE VISIT (OUTPATIENT)
Dept: BEHAVIORAL HEALTH | Facility: CLINIC | Age: 28
End: 2023-02-14
Payer: COMMERCIAL

## 2023-02-14 DIAGNOSIS — F41.1 GENERALIZED ANXIETY DISORDER: ICD-10-CM

## 2023-02-14 PROCEDURE — 90837 PSYTX W PT 60 MINUTES: CPT | Performed by: MARRIAGE & FAMILY THERAPIST

## 2023-02-14 NOTE — PROGRESS NOTES
[facility]   Therapy Progress Note        Name: Baylee Ceballos  MRN: [id number]  : 1995  Age: 27 y.o.  Date of assessment: 2023  PCP: Sola Resendiz D.O.  Persons in attendance: Patient  Total session time: 58 minutes      Topics addressed in psychotherapy include: Met with the patient in office for an individual counseling session.  The patient discussed moving into her new house and planning on projects.  Discussed thoughts of things needing to be perfect while the walls of a house are not.  Discussed being frozen by the need for perfection were simply having an acceptance of items as they are.  Discussed with patient her childhood experiences which all play a part in her current outlook of life.    This dictation has been created using voice recognition software and/or scribes. The accuracy of the dictation is limited by the abilities of the software and the expertise of the scribes. I expect there may be some errors of grammar and possibly content. I made every attempt to manually correct the errors within my dictation. However, errors related to voice recognition software and/or scribes may still exist and should be interpreted within the appropriate context.    Objective Observations:   Participation:Active verbal participation, Attentive, and Engaged   Grooming:Casual   Cognition:Alert and Fully Oriented   Eye Contact:Good   Mood:Happy   Affect:Flexible and Full range   Thought Process:Logical and Goal-directed   Speech:Rate within normal limits and Volume within normal limits    Current Risk:   Suicide: low   Homicide: low   Self-Harm: low   Relapse: low   Safety Plan Reviewed: not applicable    Care Plan Updated: No    Does patient express agreement with the above plan? Yes     Diagnosis:  1. Generalized anxiety disorder        Referral appointment(s) scheduled? No       CHLOE Bains

## 2023-02-23 ENCOUNTER — OFFICE VISIT (OUTPATIENT)
Dept: BEHAVIORAL HEALTH | Facility: CLINIC | Age: 28
End: 2023-02-23
Payer: COMMERCIAL

## 2023-02-23 DIAGNOSIS — F41.1 GENERALIZED ANXIETY DISORDER: ICD-10-CM

## 2023-02-23 PROCEDURE — 90837 PSYTX W PT 60 MINUTES: CPT | Performed by: MARRIAGE & FAMILY THERAPIST

## 2023-02-23 NOTE — PROGRESS NOTES
[facility]   Therapy Progress Note        Name: Baylee Ceballos  MRN: [id number]  : 1995  Age: 27 y.o.  Date of assessment: 2023  PCP: Sola Resendiz D.O.  Persons in attendance: Patient  Total session time: 55 minutes      Topics addressed in psychotherapy include: Met with the patient in office for an individual counseling session.  The patient discussed that she had just come from a dental appointment.  She discussed the anxiety of dental appointments which had caused her to not see a dentist for several years.  The lack of dental care had increased her anxiety.  Worked with the patient on that anxiety and discussed the ACT [facility].  Discussed different areas of life and responsibilities in each area.  The patient appears to be making good progress.    This dictation has been created using voice recognition software and/or scribes. The accuracy of the dictation is limited by the abilities of the software and the expertise of the scribes. I expect there may be some errors of grammar and possibly content. I made every attempt to manually correct the errors within my dictation. However, errors related to voice recognition software and/or scribes may still exist and should be interpreted within the appropriate context.    Objective Observations:   Participation:Active verbal participation   Grooming:Casual   Cognition:Alert and Fully Oriented   Eye Contact:Good   Mood:Euthymic and Happy   Affect:Flexible, Full range, and Congruent with content   Thought Process:Logical and Goal-directed   Speech:Rate within normal limits and Volume within normal limits    Current Risk:   Suicide: low   Homicide: low   Self-Harm: low   Relapse: low   Safety Plan Reviewed: not applicable    Care Plan Updated: No    Does patient express agreement with the above plan? Yes     Diagnosis:  1. Generalized anxiety disorder        Referral appointment(s) scheduled? No       CHLOE Bains

## 2023-02-27 ENCOUNTER — OFFICE VISIT (OUTPATIENT)
Dept: URGENT CARE | Facility: CLINIC | Age: 28
End: 2023-02-27
Payer: COMMERCIAL

## 2023-02-27 VITALS
SYSTOLIC BLOOD PRESSURE: 104 MMHG | WEIGHT: 130 LBS | BODY MASS INDEX: 23.04 KG/M2 | HEART RATE: 79 BPM | DIASTOLIC BLOOD PRESSURE: 68 MMHG | HEIGHT: 63 IN | RESPIRATION RATE: 14 BRPM | TEMPERATURE: 98.1 F | OXYGEN SATURATION: 97 %

## 2023-02-27 DIAGNOSIS — J02.9 VIRAL PHARYNGITIS: ICD-10-CM

## 2023-02-27 DIAGNOSIS — J02.9 SORE THROAT: ICD-10-CM

## 2023-02-27 LAB
EXTERNAL QUALITY CONTROL: NORMAL
INT CON NEG: NORMAL
INT CON POS: NORMAL
S PYO DNA SPEC NAA+PROBE: NOT DETECTED
SARS-COV+SARS-COV-2 AG RESP QL IA.RAPID: NEGATIVE

## 2023-02-27 PROCEDURE — 87426 SARSCOV CORONAVIRUS AG IA: CPT | Performed by: STUDENT IN AN ORGANIZED HEALTH CARE EDUCATION/TRAINING PROGRAM

## 2023-02-27 PROCEDURE — 99213 OFFICE O/P EST LOW 20 MIN: CPT | Performed by: STUDENT IN AN ORGANIZED HEALTH CARE EDUCATION/TRAINING PROGRAM

## 2023-02-27 PROCEDURE — 87651 STREP A DNA AMP PROBE: CPT | Performed by: STUDENT IN AN ORGANIZED HEALTH CARE EDUCATION/TRAINING PROGRAM

## 2023-02-27 RX ORDER — LEVONORGESTREL 52 MG/1
INTRAUTERINE DEVICE INTRAUTERINE
COMMUNITY
End: 2023-04-03

## 2023-02-27 ASSESSMENT — FIBROSIS 4 INDEX: FIB4 SCORE: 0.47

## 2023-02-27 NOTE — PROGRESS NOTES
Subjective:   CHIEF COMPLAINT  Chief Complaint   Patient presents with    Pharyngitis     X4days, Loss of voice,        HPI  Baylee Ceballos is a 27 y.o. female who presents with a chief complaint of a sore throat and soft voice x4 days.  She has been using ibuprofen and gargling salt water which helps but only provides transient relief of symptoms.  Positive ROS for tactile fever.  No nausea, vomiting or upset stomach.  No cough, wheezing or shortness of breath.  No sick contacts.    REVIEW OF SYSTEMS  General: no fever or chills  GI: no nausea or vomiting  See HPI for further details.    PAST MEDICAL HISTORY  Patient Active Problem List    Diagnosis Date Noted    Uses birth control 12/28/2022    Generalized anxiety disorder 05/03/2022    Exercise-induced asthma 01/17/2022    Allergies 01/17/2022    Mood disorder (HCC) 01/17/2022    Psoriasis 01/17/2022       SURGICAL HISTORY  patient denies any surgical history    ALLERGIES  No Known Allergies    CURRENT MEDICATIONS  Home Medications       Reviewed by Wilfrido Sow Ass't (Medical Assistant) on 02/27/23 at 1139  Med List Status: <None>     Medication Last Dose Status   albuterol 108 (90 Base) MCG/ACT Aero Soln inhalation aerosol PRN Active   buPROPion (WELLBUTRIN XL) 300 MG XL tablet Taking Active   cetirizine (ZYRTEC) 10 MG Tab PRN Active   EPINEPHrine 0.15 MG/0.15ML Solution Auto-injector PRN Active   levonorgestrel (MIRENA, 52 MG,) 20 MCG/DAY IUD  Active   levonorgestrel (MIRENA, 52 MG,) 20 MCG/DAY IUD Not Taking Active   tretinoin (RETIN-A) 0.025 % cream Taking Active                    SOCIAL HISTORY  Social History     Tobacco Use    Smoking status: Never    Smokeless tobacco: Never   Vaping Use    Vaping Use: Never used   Substance and Sexual Activity    Alcohol use: Not Currently    Drug use: Yes     Comment: edibles    Sexual activity: Not on file       FAMILY HISTORY  No family history on file.       Objective:   PHYSICAL EXAM  VITAL SIGNS: BP  "104/68   Pulse 79   Temp 36.7 °C (98.1 °F) (Temporal)   Resp 14   Ht 1.6 m (5' 3\")   Wt 59 kg (130 lb)   SpO2 97%   BMI 23.03 kg/m²     Gen: no acute distress, normal voice  Skin: dry, intact, moist mucosal membranes  Eyes: No conjunctival injection b/l  Neck: Normal range of motion. No meningeal signs.   ENT: Minimal posterior oropharyngeal erythema without exudates.  Absence of tonsils and uvula.  Lungs: No increased work of breathing.  CTAB w/ symmetric expansion  CV: RRR w/o murmurs or clicks  Psych: normal affect, normal judgement, alert, awake    POC strep: Negative  POC COVID: Negative    Assessment/Plan:     1. Sore throat  POCT SARS-COV Antigen MICHELLE Manual Result    POCT GROUP A STREP, PCR      2. Viral pharyngitis        History and physical exam consistent with a viral respiratory infection should be self-limiting.  Discussed treatment options including continuation of OTCs, Toradol versus Decadron; patient opted for continuation of OTCs.  Recommended alternating Tylenol and ibuprofen.  Return to urgent care any new/worsening symptoms or further questions or concerns.  Patient understood everything discussed.  All questions were answered.      Differential diagnosis, natural history, supportive care, and indications for immediate follow-up discussed. All questions answered. Patient agrees with the plan of care.    Follow-up as needed if symptoms worsen or fail to improve to PCP, Urgent care or Emergency Room.    Please note that this dictation was created using voice recognition software. I have made a reasonable attempt to correct obvious errors, but I expect that there are errors of grammar and possibly content that I did not discover before finalizing the note.         "

## 2023-03-10 ENCOUNTER — OFFICE VISIT (OUTPATIENT)
Dept: BEHAVIORAL HEALTH | Facility: CLINIC | Age: 28
End: 2023-03-10
Payer: COMMERCIAL

## 2023-03-10 DIAGNOSIS — F41.1 GENERALIZED ANXIETY DISORDER: ICD-10-CM

## 2023-03-10 PROCEDURE — 90837 PSYTX W PT 60 MINUTES: CPT | Performed by: MARRIAGE & FAMILY THERAPIST

## 2023-03-11 NOTE — PROGRESS NOTES
[facility]   Therapy Progress Note        Name: Baylee Ceballos  MRN: [id number]  : 1995  Age: 27 y.o.  Date of assessment: 3/10/2023  PCP: Sola Resendiz D.O.  Persons in attendance: Patient  Total session time: 57 minutes      Topics addressed in psychotherapy include: Met with the patient in office for an individual counseling session.  The patient discussed her relationship with her daughter and the relationship with her ex .  Patient appeared frustrated as she discussed his unwillingness to be flexible with visitation.  She cited that her daughter does not have an hour's worth of conversation in her at the end of the night.  Discussed birthday celebrations and how that works differently in each household.  Worked with the patient to develop the mental que cards of responses for different situations that occur repeatedly.    This dictation has been created using voice recognition software and/or scribes. The accuracy of the dictation is limited by the abilities of the software and the expertise of the scribes. I expect there may be some errors of grammar and possibly content. I made every attempt to manually correct the errors within my dictation. However, errors related to voice recognition software and/or scribes may still exist and should be interpreted within the appropriate context.    Objective Observations:   Participation:Active verbal participation, Attentive, and Engaged   Grooming:Casual   Cognition:Alert and Fully Oriented   Eye Contact:Good   Mood:Euthymic   Affect:Flexible and Full range   Thought Process:Logical and Goal-directed   Speech:Rate within normal limits and Volume within normal limits    Current Risk:   Suicide: low   Homicide: low   Self-Harm: low   Relapse: low   Safety Plan Reviewed: not applicable    Care Plan Updated: No    Does patient express agreement with the above plan? Yes     Diagnosis:  1. Generalized anxiety disorder        Referral appointment(s)  scheduled? No       VAISHALI Bains.

## 2023-03-22 ENCOUNTER — OFFICE VISIT (OUTPATIENT)
Dept: BEHAVIORAL HEALTH | Facility: CLINIC | Age: 28
End: 2023-03-22
Payer: COMMERCIAL

## 2023-03-22 DIAGNOSIS — F90.0 ATTENTION DEFICIT HYPERACTIVITY DISORDER (ADHD), INATTENTIVE TYPE, MODERATE: ICD-10-CM

## 2023-03-22 PROCEDURE — 96130 PSYCL TST EVAL PHYS/QHP 1ST: CPT | Performed by: PSYCHOLOGIST

## 2023-03-22 PROCEDURE — 96136 PSYCL/NRPSYC TST PHY/QHP 1ST: CPT | Performed by: PSYCHOLOGIST

## 2023-03-22 PROCEDURE — 90791 PSYCH DIAGNOSTIC EVALUATION: CPT | Performed by: PSYCHOLOGIST

## 2023-03-22 PROCEDURE — 96137 PSYCL/NRPSYC TST PHY/QHP EA: CPT | Performed by: PSYCHOLOGIST

## 2023-03-22 ASSESSMENT — ANXIETY QUESTIONNAIRES
5. BEING SO RESTLESS THAT IT IS HARD TO SIT STILL: MORE THAN HALF THE DAYS
IF YOU CHECKED OFF ANY PROBLEMS ON THIS QUESTIONNAIRE, HOW DIFFICULT HAVE THESE PROBLEMS MADE IT FOR YOU TO DO YOUR WORK, TAKE CARE OF THINGS AT HOME, OR GET ALONG WITH OTHER PEOPLE: VERY DIFFICULT
GAD7 TOTAL SCORE: 15
7. FEELING AFRAID AS IF SOMETHING AWFUL MIGHT HAPPEN: SEVERAL DAYS
4. TROUBLE RELAXING: NEARLY EVERY DAY
1. FEELING NERVOUS, ANXIOUS, OR ON EDGE: NEARLY EVERY DAY
6. BECOMING EASILY ANNOYED OR IRRITABLE: SEVERAL DAYS
2. NOT BEING ABLE TO STOP OR CONTROL WORRYING: MORE THAN HALF THE DAYS
3. WORRYING TOO MUCH ABOUT DIFFERENT THINGS: NEARLY EVERY DAY

## 2023-03-22 ASSESSMENT — PATIENT HEALTH QUESTIONNAIRE - PHQ9
5. POOR APPETITE OR OVEREATING: 3 - NEARLY EVERY DAY
CLINICAL INTERPRETATION OF PHQ2 SCORE: 3
SUM OF ALL RESPONSES TO PHQ QUESTIONS 1-9: 15
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
## 2023-03-27 ENCOUNTER — OFFICE VISIT (OUTPATIENT)
Dept: BEHAVIORAL HEALTH | Facility: CLINIC | Age: 28
End: 2023-03-27
Payer: COMMERCIAL

## 2023-03-27 DIAGNOSIS — F41.1 GENERALIZED ANXIETY DISORDER: ICD-10-CM

## 2023-03-27 PROCEDURE — 90837 PSYTX W PT 60 MINUTES: CPT | Performed by: MARRIAGE & FAMILY THERAPIST

## 2023-03-27 NOTE — PROGRESS NOTES
[facility]   Therapy Progress Note        Name: Baylee Ceballos  MRN: [id number]  : 1995  Age: 27 y.o.  Date of assessment: 3/27/2023  PCP: Sola Resendiz D.O.  Persons in attendance: Patient  Total session time: 55 minutes      Topics addressed in psychotherapy include: Met with the patient in office for an individual counseling session.  Patient discussed several concerns regarding her daughter.  Worked with patient on parenting outlooks and normalizing some behaviors.  Worked with the patient on understanding parenting changes his life changes.  Provided supportive psychotherapy.    This dictation has been created using voice recognition software and/or scribes. The accuracy of the dictation is limited by the abilities of the software and the expertise of the scribes. I expect there may be some errors of grammar and possibly content. I made every attempt to manually correct the errors within my dictation. However, errors related to voice recognition software and/or scribes may still exist and should be interpreted within the appropriate context.    Objective Observations:   Participation:Active verbal participation, Attentive, and Engaged   Grooming:Casual   Cognition:Alert and Fully Oriented   Eye Contact:Good   Mood:Anxious   Affect:Flexible and Full range   Thought Process:Logical and Goal-directed   Speech:Rate within normal limits and Volume within normal limits    Current Risk:   Suicide: low   Homicide: low   Self-Harm: low   Relapse: low   Safety Plan Reviewed: not applicable    Care Plan Updated: No    Does patient express agreement with the above plan? Yes     Diagnosis:  1. Generalized anxiety disorder        Referral appointment(s) scheduled? No       CHLOE Bains

## 2023-04-03 ENCOUNTER — OFFICE VISIT (OUTPATIENT)
Dept: INTERNAL MEDICINE | Facility: OTHER | Age: 28
End: 2023-04-03
Payer: COMMERCIAL

## 2023-04-03 VITALS
HEIGHT: 63 IN | SYSTOLIC BLOOD PRESSURE: 118 MMHG | DIASTOLIC BLOOD PRESSURE: 73 MMHG | TEMPERATURE: 98.4 F | HEART RATE: 69 BPM | BODY MASS INDEX: 24.13 KG/M2 | OXYGEN SATURATION: 98 % | WEIGHT: 136.2 LBS

## 2023-04-03 DIAGNOSIS — F39 MOOD DISORDER (HCC): ICD-10-CM

## 2023-04-03 PROCEDURE — 99213 OFFICE O/P EST LOW 20 MIN: CPT | Mod: GC | Performed by: STUDENT IN AN ORGANIZED HEALTH CARE EDUCATION/TRAINING PROGRAM

## 2023-04-03 ASSESSMENT — FIBROSIS 4 INDEX: FIB4 SCORE: 0.47

## 2023-04-03 NOTE — PROGRESS NOTES
Teaching Physician Attestation      Level of Participation    I have personally interviewed and examined the patient.  In addition, I discussed with the resident physician the patient's history, exam, assessment and plan in detail.  Topics listed in my addendum were the focus of the visit.  Healthcare maintenance was not addressed this visit unless listed as a topic in my addendum.  I agree with the plan as written along with the following additions/modifications:    Difficulty with attention in setting of anxiety  -elevated polly score, today does not meet formal criteria for depression.  Patient is on bupropion.  Patient reports interest in getting pregnant.  Discussed the possibility of multiple etiologies for her symptoms, including the activating dopaminergic effect of bupropion.  Anxiety could also be contributing.  Further complicating the picture is the impact on the developing fetus of certain psychiatric medications.  As such, we decided to refer to psychiatry for further evaluation, appreciate support.    Chart diagnosis of asthma was not addressed today, although on review after precepting I see that there are no PFTs in this EMR.  Would discuss further at follow-up, might benefit from PFTs if clinically indicated.

## 2023-04-03 NOTE — PROGRESS NOTES
"      Office Visit Follow up    Chief Complaint   Patient presents with    Medication Management     Requesting medication for ADHD        Subjective   Pt endorses difficulty completing tasks x2 years. She is coming at the advice of her  who is a family practice doctor. Her  had already talked with a Gynecologist who had suggested that ADHD meds would likely be safe during pregnancy. This in combination with a dx of ADHD (made by a Psychologist) are the reason for her visit today.     She also endorses sx of inattentiveness (she states she zones in and out of conversations and that when she walks away from the sink she easily forgets that there are still dishes to be done). She feels that these sx occur in more than 1 setting and not just at home and that they are significantly affecting her life. However, she is unsure if she had these sx in childhood.     ROS   -Psych: SIGECAPS = 4/9 x15 years with no SI/HI/AVH/zaheer. GAD7 = 14.     /73 (BP Location: Left arm, Patient Position: Sitting, BP Cuff Size: Adult)   Pulse 69   Temp 36.9 °C (98.4 °F) (Temporal)   Ht 1.6 m (5' 3\")   Wt 61.8 kg (136 lb 3.2 oz)   SpO2 98%   BMI 24.13 kg/m²     Physical Exam   -General: NAD, converses well  -Cardio: no murmurs or gallops  -Resp: lungs CTAB, symmetric expansion  -Abd: soft and nontender, no guarding or rebound      Data    -Cr = 0.77  -Hgb = 14.3   -A1c = 5%  -LDL = 76       Note: I have reviewed all pertinent labs and diagnostic tests associated with this visit with specific comments listed under the assessment and plan below    Assessment and Plan  Baylee Ceballos is a 27 year old female with a h/o MARY, Psoriasis, Asthma, Allergic Rhinitis (on EPI pens due to a hives and Oropharyngeal edema in the past), Appendectomy (2013) and MJ use.    She presented with symptoms of inattentiveness. Psych consult = pending.  "     -----------------------------------------------------------------------------------------------------------------------------------------------------------------------------------------------------------  #Inattentiveness  (Etiology uncertain as pt does have some sx suggestive of ADHD, but lack of clear onset in childhood makes the dx of ADHD highly unlikely. Of note, pt is taking Buproprion which may be worsening pt's anxiety sx and may be contributing her sx of inattentiveness)  -dx and management pending Psych recs    #MARY + Dysthymia  -SIGECAPS = 4/9 without SI/HI/AVH/zaheer  -GAD7 = 14 (consistent with moderate anxiety)  -pt has failed Sertraline 150mg x9 months  -pt has failed Buproprion x10 months  -c/w Buproprion for now  -further management per Psych recs     #Asthma  -c/w Albuterol    #Allergic Rhinitis  -c/w Cetirizine  -c/w PRN EPI pen due to h/o oropharyngeal edema      #Preventative Health Care  -COVID vaccine = obtained  -next TD booster = due ~2029  -pt is not a vegetarian  -next PAP = per Gyn  -HIV x1 = done  -Hep C x1 = done      Code Status = Full Code    Followup: 5 weeks      Jimenez Dalton, PGY2  [facility]    Pt has been seen and discussed with the Attending Physician.

## 2023-04-10 ENCOUNTER — OFFICE VISIT (OUTPATIENT)
Dept: BEHAVIORAL HEALTH | Facility: CLINIC | Age: 28
End: 2023-04-10
Payer: COMMERCIAL

## 2023-04-10 DIAGNOSIS — F90.0 ATTENTION DEFICIT HYPERACTIVITY DISORDER (ADHD), PREDOMINANTLY INATTENTIVE TYPE: ICD-10-CM

## 2023-04-10 DIAGNOSIS — F41.1 GENERALIZED ANXIETY DISORDER: ICD-10-CM

## 2023-04-10 PROCEDURE — 90837 PSYTX W PT 60 MINUTES: CPT | Performed by: MARRIAGE & FAMILY THERAPIST

## 2023-04-10 NOTE — PROGRESS NOTES
[facility]   Therapy Progress Note        Name: Baylee Ceballos  MRN: [id number]  : 1995  Age: 27 y.o.  Date of assessment: 4/10/2023  PCP: Sola Resendiz D.O.  Persons in attendance: Patient  Total session time: 55 minutes      Topics addressed in psychotherapy include: Met with the patient in office for an individual counseling session.  The patient discussed difficulty in obtaining psychiatric appointment for adhd medications.  Provided psychiatric referral to the patient.  Worked with patient on anxiety regarding her parenting and fears of not doing well enough.  Patient discussed her depression has secondary to her daughters needs.  When her daughter is at her father's for the summer however the patients depression increases dramatically.  Worked with patient on outlook of herself.    This dictation has been created using voice recognition software and/or scribes. The accuracy of the dictation is limited by the abilities of the software and the expertise of the scribes. I expect there may be some errors of grammar and possibly content. I made every attempt to manually correct the errors within my dictation. However, errors related to voice recognition software and/or scribes may still exist and should be interpreted within the appropriate context.    Objective Observations:   Participation:Active verbal participation, Attentive, and Engaged   Grooming:Casual   Cognition:Alert and Fully Oriented   Eye Contact:Good   Mood:Anxious   Affect:Flexible and Full range   Thought Process:Logical and Goal-directed   Speech:Rate within normal limits and Volume within normal limits    Current Risk:   Suicide: low   Homicide: low   Self-Harm: low   Relapse: low   Safety Plan Reviewed: not applicable    Care Plan Updated: No    Does patient express agreement with the above plan? Yes     Diagnosis:  1. Attention deficit hyperactivity disorder (ADHD), predominantly inattentive type    2. Generalized anxiety  disorder        Referral appointment(s) scheduled? VAISHALI Choi.

## 2023-04-25 ENCOUNTER — OFFICE VISIT (OUTPATIENT)
Dept: BEHAVIORAL HEALTH | Facility: CLINIC | Age: 28
End: 2023-04-25
Payer: COMMERCIAL

## 2023-04-25 DIAGNOSIS — F41.1 GENERALIZED ANXIETY DISORDER: ICD-10-CM

## 2023-04-25 PROCEDURE — 90837 PSYTX W PT 60 MINUTES: CPT | Performed by: MARRIAGE & FAMILY THERAPIST

## 2023-04-25 NOTE — PROGRESS NOTES
[facility]   Therapy Progress Note        Name: Baylee Ceballos  MRN: [id number]  : 1995  Age: 27 y.o.  Date of assessment: 2023  PCP: Sola Resendiz D.O.  Persons in attendance: Patient  Total session time: 55 minutes      Topics addressed in psychotherapy include: Met with the patient in office for an individual counseling session.  The patient reported that she feels she is doing well right now despite having some feelings of being overwhelmed with house projects.  Discussed recent adventures with her daughter.  Her daughter will leave to live with her father for the summer one school ends.  Discussed with the patient how that would change for daily routine and her moods.  Patient appears to be making good progress.    This dictation has been created using voice recognition software and/or scribes. The accuracy of the dictation is limited by the abilities of the software and the expertise of the scribes. I expect there may be some errors of grammar and possibly content. I made every attempt to manually correct the errors within my dictation. However, errors related to voice recognition software and/or scribes may still exist and should be interpreted within the appropriate context.    Objective Observations:   Participation:Active verbal participation, Attentive, Engaged, and Open to feedback   Grooming:Casual   Cognition:Alert and Fully Oriented   Eye Contact:Good   Mood:Euthymic and Happy   Affect:Flexible and Full range   Thought Process:Logical and Goal-directed   Speech:Rate within normal limits and Volume within normal limits    Current Risk:   Suicide: low   Homicide: low   Self-Harm: low   Relapse: low   Safety Plan Reviewed: not applicable    Care Plan Updated: No    Does patient express agreement with the above plan? Yes     Diagnosis:  No diagnosis found.    Referral appointment(s) scheduled? No       CHLOE Bains

## 2023-05-26 ENCOUNTER — OFFICE VISIT (OUTPATIENT)
Dept: BEHAVIORAL HEALTH | Facility: CLINIC | Age: 28
End: 2023-05-26
Payer: COMMERCIAL

## 2023-05-26 DIAGNOSIS — F41.1 GENERALIZED ANXIETY DISORDER: ICD-10-CM

## 2023-05-26 PROCEDURE — 90837 PSYTX W PT 60 MINUTES: CPT | Performed by: MARRIAGE & FAMILY THERAPIST

## 2023-05-30 NOTE — PROGRESS NOTES
[facility]   Therapy Progress Note        Name: Baylee Ceballos  MRN: [id number]  : 1995  Age: 27 y.o.  Date of assessment: 2023  PCP: Sola Resendiz D.O.  Persons in attendance: Patient  Total session time: 55 minutes      Topics addressed in psychotherapy include: Met with the patient and in office for an individual counseling session.  The patient discussed that her daughter will be leaving to go stay with her father four the summer soon.  Discuss with the patient how her obvious pregnancy will affect her daughter when she returns at the end of summer.  Discussed with the patient measures which she is been taking in preparing her daughter to be a big sister.  Discussed with the patient struggles which she is having with her ex .  Patient appears to be making good progress.    This dictation has been created using voice recognition software and/or scribes. The accuracy of the dictation is limited by the abilities of the software and the expertise of the scribes. I expect there may be some errors of grammar and possibly content. I made every attempt to manually correct the errors within my dictation. However, errors related to voice recognition software and/or scribes may still exist and should be interpreted within the appropriate context.    Objective Observations:   Participation:Active verbal participation, Attentive, and Engaged   Grooming:Casual   Cognition:Alert and Fully Oriented   Eye Contact:Good   Mood:Anxious   Affect:Flexible and Full range   Thought Process:Logical and Goal-directed   Speech:Rate within normal limits and Volume within normal limits    Current Risk:   Suicide: low   Homicide: low   Self-Harm: low   Relapse: low   Safety Plan Reviewed: not applicable    Care Plan Updated: No    Does patient express agreement with the above plan? Yes     Diagnosis:  No diagnosis found.    Referral appointment(s) scheduled? No       CHLOE Bains

## 2023-06-06 ENCOUNTER — HOSPITAL ENCOUNTER (OUTPATIENT)
Facility: MEDICAL CENTER | Age: 28
End: 2023-06-06
Payer: COMMERCIAL

## 2023-06-09 ENCOUNTER — OFFICE VISIT (OUTPATIENT)
Dept: URGENT CARE | Facility: CLINIC | Age: 28
End: 2023-06-09
Payer: COMMERCIAL

## 2023-06-09 ENCOUNTER — PHARMACY VISIT (OUTPATIENT)
Dept: PHARMACY | Facility: MEDICAL CENTER | Age: 28
End: 2023-06-09
Payer: COMMERCIAL

## 2023-06-09 VITALS
TEMPERATURE: 97.6 F | WEIGHT: 140 LBS | SYSTOLIC BLOOD PRESSURE: 110 MMHG | OXYGEN SATURATION: 97 % | HEIGHT: 63 IN | BODY MASS INDEX: 24.8 KG/M2 | RESPIRATION RATE: 18 BRPM | HEART RATE: 94 BPM | DIASTOLIC BLOOD PRESSURE: 70 MMHG

## 2023-06-09 DIAGNOSIS — Z3A.10 10 WEEKS GESTATION OF PREGNANCY: ICD-10-CM

## 2023-06-09 DIAGNOSIS — R11.2 NAUSEA AND VOMITING, UNSPECIFIED VOMITING TYPE: ICD-10-CM

## 2023-06-09 PROCEDURE — 3078F DIAST BP <80 MM HG: CPT | Performed by: NURSE PRACTITIONER

## 2023-06-09 PROCEDURE — RXMED WILLOW AMBULATORY MEDICATION CHARGE: Performed by: NURSE PRACTITIONER

## 2023-06-09 PROCEDURE — 3074F SYST BP LT 130 MM HG: CPT | Performed by: NURSE PRACTITIONER

## 2023-06-09 PROCEDURE — 99213 OFFICE O/P EST LOW 20 MIN: CPT | Performed by: NURSE PRACTITIONER

## 2023-06-09 RX ORDER — DOXYLAMINE SUCCINATE AND PYRIDOXINE HYDROCHLORIDE, DELAYED RELEASE TABLETS 10 MG/10 MG 10; 10 MG/1; MG/1
2 TABLET, DELAYED RELEASE ORAL
Qty: 90 TABLET | Refills: 1 | Status: SHIPPED | OUTPATIENT
Start: 2023-06-09 | End: 2023-07-17 | Stop reason: SDUPTHER

## 2023-06-09 RX ORDER — ONDANSETRON 4 MG/1
4 TABLET, ORALLY DISINTEGRATING ORAL EVERY 6 HOURS PRN
Qty: 15 TABLET | Refills: 0 | Status: ON HOLD | OUTPATIENT
Start: 2023-06-09 | End: 2023-12-30

## 2023-06-09 ASSESSMENT — ENCOUNTER SYMPTOMS
NAUSEA: 1
VOMITING: 1
ABDOMINAL PAIN: 0

## 2023-06-09 ASSESSMENT — FIBROSIS 4 INDEX: FIB4 SCORE: 0.47

## 2023-06-09 NOTE — PROGRESS NOTES
"Subjective     Baylee Ceballos is a 27 y.o. female who presents with Nausea (Nausea, at 10 weeks of gestation, morning sickness unable to see provider, for a few weeks.)            Nausea  This is a new problem. Episode onset: pt reports she is 10 weeks pregnant. +hx of hyperemesis gravidarum. as her pregnancy progresses her nause and vomiting is getting more intense. she has an appt with OBGYN in 3 weeks. Associated symptoms include nausea and vomiting. Pertinent negatives include no abdominal pain. Treatments tried: B6, B12, fina lozenges, small meals. The treatment provided no relief.       Review of Systems   Gastrointestinal:  Positive for nausea and vomiting. Negative for abdominal pain.   All other systems reviewed and are negative.         Past Medical History:   Diagnosis Date    Generalized anxiety disorder 5/3/2022    Psoriasis 1/17/2022    Urticaria 12/26/2021    History reviewed. No pertinent surgical history.   Social History     Socioeconomic History    Marital status:      Spouse name: Not on file    Number of children: Not on file    Years of education: Not on file    Highest education level: Not on file   Occupational History    Not on file   Tobacco Use    Smoking status: Never    Smokeless tobacco: Never   Vaping Use    Vaping Use: Never used   Substance and Sexual Activity    Alcohol use: Not Currently    Drug use: Not Currently     Comment: edibles    Sexual activity: Not on file   Other Topics Concern    Not on file   Social History Narrative    Not on file     Social Determinants of Health     Financial Resource Strain: Not on file   Food Insecurity: Not on file   Transportation Needs: Not on file   Physical Activity: Not on file   Stress: Not on file   Social Connections: Not on file   Intimate Partner Violence: Not on file   Housing Stability: Not on file         Objective     /70   Pulse 94   Temp 36.4 °C (97.6 °F) (Temporal)   Resp 18   Ht 1.6 m (5' 3\")   Wt 63.5 " kg (140 lb)   SpO2 97%   BMI 24.80 kg/m²      Physical Exam  Vitals and nursing note reviewed.   Constitutional:       Appearance: Normal appearance. She is normal weight.   HENT:      Head: Normocephalic and atraumatic.      Nose: Nose normal.      Mouth/Throat:      Mouth: Mucous membranes are moist.      Pharynx: Oropharynx is clear.   Eyes:      Extraocular Movements: Extraocular movements intact.      Pupils: Pupils are equal, round, and reactive to light.   Cardiovascular:      Rate and Rhythm: Normal rate and regular rhythm.   Pulmonary:      Effort: Pulmonary effort is normal.   Musculoskeletal:         General: Normal range of motion.      Cervical back: Normal range of motion.   Skin:     General: Skin is warm and dry.      Capillary Refill: Capillary refill takes less than 2 seconds.   Neurological:      General: No focal deficit present.      Mental Status: She is alert and oriented to person, place, and time. Mental status is at baseline.   Psychiatric:         Mood and Affect: Mood normal.         Speech: Speech normal.         Thought Content: Thought content normal.         Judgment: Judgment normal.                             Assessment & Plan        1. 10 weeks gestation of pregnancy    2. Nausea and vomiting, unspecified vomiting type  - Doxylamine-Pyridoxine 10-10 MG Tablet Delayed Response delayed-release tablet; Take 2 Tablets by mouth at bedtime.  Dispense: 90 Tablet; Refill: 1  - ondansetron (ZOFRAN ODT) 4 MG TABLET DISPERSIBLE; Take 1 Tablet by mouth every 6 hours as needed for Nausea/Vomiting.  Dispense: 15 Tablet; Refill: 0          Advised her to use zofran as a rescue medication if needed  Strict ER precautions discussed for inability to tolerate PO fluids despite meds given. She understands  Supportive care, differential diagnoses, and indications for immediate follow-up discussed with patient.    Pathogenesis of diagnosis discussed including typical length and natural progression.     Instructed to return to  or nearest emergency department if symptoms fail to improve, for any change in condition, further concerns, or new concerning symptoms.  Patient states understanding of the plan of care and discharge instructions.

## 2023-06-15 ENCOUNTER — APPOINTMENT (OUTPATIENT)
Dept: BEHAVIORAL HEALTH | Facility: CLINIC | Age: 28
End: 2023-06-15
Payer: COMMERCIAL

## 2023-06-21 SDOH — ECONOMIC STABILITY: HOUSING INSECURITY: IN THE LAST 12 MONTHS, HOW MANY PLACES HAVE YOU LIVED?: 2

## 2023-06-21 SDOH — ECONOMIC STABILITY: INCOME INSECURITY: HOW HARD IS IT FOR YOU TO PAY FOR THE VERY BASICS LIKE FOOD, HOUSING, MEDICAL CARE, AND HEATING?: NOT HARD AT ALL

## 2023-06-21 SDOH — ECONOMIC STABILITY: HOUSING INSECURITY
IN THE LAST 12 MONTHS, WAS THERE A TIME WHEN YOU DID NOT HAVE A STEADY PLACE TO SLEEP OR SLEPT IN A SHELTER (INCLUDING NOW)?: NO

## 2023-06-21 SDOH — HEALTH STABILITY: MENTAL HEALTH
STRESS IS WHEN SOMEONE FEELS TENSE, NERVOUS, ANXIOUS, OR CAN'T SLEEP AT NIGHT BECAUSE THEIR MIND IS TROUBLED. HOW STRESSED ARE YOU?: TO SOME EXTENT

## 2023-06-21 SDOH — ECONOMIC STABILITY: INCOME INSECURITY: IN THE LAST 12 MONTHS, WAS THERE A TIME WHEN YOU WERE NOT ABLE TO PAY THE MORTGAGE OR RENT ON TIME?: NO

## 2023-06-21 SDOH — HEALTH STABILITY: PHYSICAL HEALTH: ON AVERAGE, HOW MANY DAYS PER WEEK DO YOU ENGAGE IN MODERATE TO STRENUOUS EXERCISE (LIKE A BRISK WALK)?: 3 DAYS

## 2023-06-21 SDOH — ECONOMIC STABILITY: TRANSPORTATION INSECURITY
IN THE PAST 12 MONTHS, HAS THE LACK OF TRANSPORTATION KEPT YOU FROM MEDICAL APPOINTMENTS OR FROM GETTING MEDICATIONS?: NO

## 2023-06-21 SDOH — ECONOMIC STABILITY: FOOD INSECURITY: WITHIN THE PAST 12 MONTHS, THE FOOD YOU BOUGHT JUST DIDN'T LAST AND YOU DIDN'T HAVE MONEY TO GET MORE.: NEVER TRUE

## 2023-06-21 SDOH — ECONOMIC STABILITY: TRANSPORTATION INSECURITY
IN THE PAST 12 MONTHS, HAS LACK OF RELIABLE TRANSPORTATION KEPT YOU FROM MEDICAL APPOINTMENTS, MEETINGS, WORK OR FROM GETTING THINGS NEEDED FOR DAILY LIVING?: NO

## 2023-06-21 SDOH — ECONOMIC STABILITY: FOOD INSECURITY: WITHIN THE PAST 12 MONTHS, YOU WORRIED THAT YOUR FOOD WOULD RUN OUT BEFORE YOU GOT MONEY TO BUY MORE.: NEVER TRUE

## 2023-06-21 SDOH — ECONOMIC STABILITY: TRANSPORTATION INSECURITY
IN THE PAST 12 MONTHS, HAS LACK OF TRANSPORTATION KEPT YOU FROM MEETINGS, WORK, OR FROM GETTING THINGS NEEDED FOR DAILY LIVING?: NO

## 2023-06-21 SDOH — HEALTH STABILITY: PHYSICAL HEALTH: ON AVERAGE, HOW MANY MINUTES DO YOU ENGAGE IN EXERCISE AT THIS LEVEL?: 60 MIN

## 2023-06-21 ASSESSMENT — SOCIAL DETERMINANTS OF HEALTH (SDOH)
IN A TYPICAL WEEK, HOW MANY TIMES DO YOU TALK ON THE PHONE WITH FAMILY, FRIENDS, OR NEIGHBORS?: TWICE A WEEK
WITHIN THE PAST 12 MONTHS, YOU WORRIED THAT YOUR FOOD WOULD RUN OUT BEFORE YOU GOT THE MONEY TO BUY MORE: NEVER TRUE
HOW OFTEN DO YOU GET TOGETHER WITH FRIENDS OR RELATIVES?: ONCE A WEEK
HOW OFTEN DO YOU HAVE A DRINK CONTAINING ALCOHOL: NEVER
HOW MANY DRINKS CONTAINING ALCOHOL DO YOU HAVE ON A TYPICAL DAY WHEN YOU ARE DRINKING: PATIENT DOES NOT DRINK
IN A TYPICAL WEEK, HOW MANY TIMES DO YOU TALK ON THE PHONE WITH FAMILY, FRIENDS, OR NEIGHBORS?: TWICE A WEEK
HOW OFTEN DO YOU GET TOGETHER WITH FRIENDS OR RELATIVES?: ONCE A WEEK
HOW OFTEN DO YOU ATTEND CHURCH OR RELIGIOUS SERVICES?: NEVER
DO YOU BELONG TO ANY CLUBS OR ORGANIZATIONS SUCH AS CHURCH GROUPS UNIONS, FRATERNAL OR ATHLETIC GROUPS, OR SCHOOL GROUPS?: YES
HOW OFTEN DO YOU ATTENT MEETINGS OF THE CLUB OR ORGANIZATION YOU BELONG TO?: MORE THAN 4 TIMES PER YEAR
HOW HARD IS IT FOR YOU TO PAY FOR THE VERY BASICS LIKE FOOD, HOUSING, MEDICAL CARE, AND HEATING?: NOT HARD AT ALL
HOW OFTEN DO YOU ATTEND CHURCH OR RELIGIOUS SERVICES?: NEVER
HOW OFTEN DO YOU ATTENT MEETINGS OF THE CLUB OR ORGANIZATION YOU BELONG TO?: MORE THAN 4 TIMES PER YEAR
DO YOU BELONG TO ANY CLUBS OR ORGANIZATIONS SUCH AS CHURCH GROUPS UNIONS, FRATERNAL OR ATHLETIC GROUPS, OR SCHOOL GROUPS?: YES
HOW OFTEN DO YOU HAVE SIX OR MORE DRINKS ON ONE OCCASION: NEVER

## 2023-06-21 ASSESSMENT — LIFESTYLE VARIABLES
HOW MANY STANDARD DRINKS CONTAINING ALCOHOL DO YOU HAVE ON A TYPICAL DAY: PATIENT DOES NOT DRINK
SKIP TO QUESTIONS 9-10: 1
AUDIT-C TOTAL SCORE: 0
HOW OFTEN DO YOU HAVE SIX OR MORE DRINKS ON ONE OCCASION: NEVER
HOW OFTEN DO YOU HAVE A DRINK CONTAINING ALCOHOL: NEVER

## 2023-06-22 ENCOUNTER — OFFICE VISIT (OUTPATIENT)
Dept: MEDICAL GROUP | Facility: MEDICAL CENTER | Age: 28
End: 2023-06-22
Payer: COMMERCIAL

## 2023-06-22 VITALS
SYSTOLIC BLOOD PRESSURE: 96 MMHG | HEIGHT: 63 IN | OXYGEN SATURATION: 98 % | BODY MASS INDEX: 25.16 KG/M2 | TEMPERATURE: 98.1 F | DIASTOLIC BLOOD PRESSURE: 50 MMHG | WEIGHT: 141.98 LBS | HEART RATE: 92 BPM

## 2023-06-22 DIAGNOSIS — F90.0 ATTENTION DEFICIT HYPERACTIVITY DISORDER (ADHD), PREDOMINANTLY INATTENTIVE TYPE: ICD-10-CM

## 2023-06-22 DIAGNOSIS — F39 MOOD DISORDER (HCC): ICD-10-CM

## 2023-06-22 DIAGNOSIS — F41.1 GENERALIZED ANXIETY DISORDER: ICD-10-CM

## 2023-06-22 DIAGNOSIS — J45.990 EXERCISE-INDUCED ASTHMA: ICD-10-CM

## 2023-06-22 DIAGNOSIS — Z76.89 ENCOUNTER TO ESTABLISH CARE: ICD-10-CM

## 2023-06-22 DIAGNOSIS — Z3A.11 11 WEEKS GESTATION OF PREGNANCY: ICD-10-CM

## 2023-06-22 DIAGNOSIS — O21.9 NAUSEA AND VOMITING DURING PREGNANCY: ICD-10-CM

## 2023-06-22 DIAGNOSIS — K59.00 CONSTIPATION, UNSPECIFIED CONSTIPATION TYPE: ICD-10-CM

## 2023-06-22 PROBLEM — Z78.9 USES BIRTH CONTROL: Status: RESOLVED | Noted: 2022-12-28 | Resolved: 2023-06-22

## 2023-06-22 PROCEDURE — 3074F SYST BP LT 130 MM HG: CPT | Performed by: NURSE PRACTITIONER

## 2023-06-22 PROCEDURE — RXMED WILLOW AMBULATORY MEDICATION CHARGE: Performed by: NURSE PRACTITIONER

## 2023-06-22 PROCEDURE — 99214 OFFICE O/P EST MOD 30 MIN: CPT | Performed by: NURSE PRACTITIONER

## 2023-06-22 PROCEDURE — 3078F DIAST BP <80 MM HG: CPT | Performed by: NURSE PRACTITIONER

## 2023-06-22 RX ORDER — ALBUTEROL SULFATE 90 UG/1
1-2 AEROSOL, METERED RESPIRATORY (INHALATION) EVERY 6 HOURS PRN
Qty: 8.5 G | Refills: 11 | Status: SHIPPED | OUTPATIENT
Start: 2023-06-22

## 2023-06-22 ASSESSMENT — FIBROSIS 4 INDEX: FIB4 SCORE: 0.47

## 2023-06-22 NOTE — PROGRESS NOTES
Chief Complaint   Patient presents with    Establish Care     Prior PCP Sola Resendiz D.O.    Asthma     Albuterol Inh. Refill      Baylee Traci Ceballos is a 27 y.o. female here to establish care and to discuss the evaluation and management of:    Patient presents today to establish care.    She is currently 11 weeks pregnant.  G1, P1.  Reported history of hyperemesis gravidarum  She reports nausea and vomiting during this pregnancy however has been less severe than her previous pregnancy.  Having emesis about 3 to 4 days a week-typically several hours after eating.  Recent presented to the urgent care for persistent nausea.  She has tried several over-the-counter products.  Currently taking doxylamine/pyridoxine and sparingly Zofran.  She has not lost any weight.  She reports that she has been staying hydrated and taking prenatals.  She does have her initial OB/GYN appointment on Monday.  -Reports last Pap was normal.    She does endorse constipation, bloating, increased intestinal gas.    She does have a diagnosis of inattentive ADD.  This was formally diagnosed with evaluation around April of this year.  Has not been on any medications just yet due to her recent pregnancy.    She had previously been taking Wellbutrin for her depression and anxiety.  Felt as if it helped more with her depression.  Has tried sertraline in the past however this was not as effective.  Currently going to counseling which she does enjoy.    She is requesting a refill on her albuterol inhaler as she does have exercise-induced asthma.      Chart reviewed    ROS: SEE ABOVE        Current Outpatient Medications:     albuterol 108 (90 Base) MCG/ACT Aero Soln inhalation aerosol, Inhale 1-2 Puffs every 6 hours as needed for Shortness of Breath., Disp: 8.5 g, Rfl: 11    Doxylamine-Pyridoxine 10-10 MG Tablet Delayed Response delayed-release tablet, Take 2 Tablets by mouth at bedtime. (Patient taking differently: Take 2 Tablets by mouth at bedtime.  And One Tablet in the morning.), Disp: 90 Tablet, Rfl: 1    ondansetron (ZOFRAN ODT) 4 MG TABLET DISPERSIBLE, Take 1 Tablet by mouth every 6 hours as needed for Nausea/Vomiting., Disp: 15 Tablet, Rfl: 0    cetirizine (ZYRTEC) 10 MG Tab, Take 20 mg by mouth every day., Disp: , Rfl:     No Known Allergies    Past Medical History:   Diagnosis Date    Allergy     seasonal    Asthma     mostly exercise induced/cold temperatures    Depression     Generalized anxiety disorder 05/03/2022    Migraine     Psoriasis 01/17/2022    Urticaria 12/26/2021     Past Surgical History:   Procedure Laterality Date    APPENDECTOMY       Family History   Problem Relation Age of Onset    Psychiatric Illness Maternal Grandmother      Social History     Socioeconomic History    Marital status:      Spouse name: Not on file    Number of children: Not on file    Years of education: Not on file    Highest education level: Associate degree: academic program   Occupational History    Not on file   Tobacco Use    Smoking status: Never    Smokeless tobacco: Never   Vaping Use    Vaping Use: Never used   Substance and Sexual Activity    Alcohol use: Not Currently    Drug use: Not Currently    Sexual activity: Yes     Partners: Male   Other Topics Concern    Not on file   Social History Narrative    Not on file     Social Determinants of Health     Financial Resource Strain: Low Risk  (6/21/2023)    Overall Financial Resource Strain (CARDIA)     Difficulty of Paying Living Expenses: Not hard at all   Food Insecurity: No Food Insecurity (6/21/2023)    Hunger Vital Sign     Worried About Running Out of Food in the Last Year: Never true     Ran Out of Food in the Last Year: Never true   Transportation Needs: No Transportation Needs (6/21/2023)    PRAPARE - Transportation     Lack of Transportation (Medical): No     Lack of Transportation (Non-Medical): No   Physical Activity: Sufficiently Active (6/21/2023)    Exercise Vital Sign     Days of  "Exercise per Week: 3 days     Minutes of Exercise per Session: 60 min   Stress: Stress Concern Present (2023)    Citizen of [place] of Occupational Health - Occupational Stress Questionnaire     Feeling of Stress : To some extent   Social Connections: Moderately Integrated (2023)    Social Connection and Isolation Panel [NHANES]     Frequency of Communication with Friends and Family: Twice a week     Frequency of Social Gatherings with Friends and Family: Once a week     Attends Sikh Services: Never     Active Member of Clubs or Organizations: Yes     Attends Club or Organization Meetings: More than 4 times per year     Marital Status:    Intimate Partner Violence: Not on file   Housing Stability: Low Risk  (2023)    Housing Stability Vital Sign     Unable to Pay for Housing in the Last Year: No     Number of Places Lived in the Last Year: 2     Unstable Housing in the Last Year: No       Objective:     Vitals: BP 96/50 (BP Location: Right arm, Patient Position: Sitting, BP Cuff Size: Adult)   Pulse 92   Temp 36.7 °C (98.1 °F) (Temporal)   Ht 1.6 m (5' 3\")   Wt 64.4 kg (141 lb 15.6 oz)   SpO2 98%   BMI 25.15 kg/m²      General: Alert, pleasant, NAD  HEENT:  Normocephalic.  Neck supple.  No thyromegaly or masses palpated. No cervical or supraclavicular lymphadenopathy.  Heart:  Regular rate and rhythm.  S1 and S2 normal.  No murmurs appreciated.    Respiratory:  Normal respiratory effort.  Clear to auscultation bilaterally.    Skin:  Warm, dry, no rashes  Musculoskeletal:  Gait is normal.  Moves all extremities well.  Extremities:   No leg edema.  Neurological: No tremors  Psych:  Affect/mood is normal, judgement is good, memory is intact, grooming is appropriate.    Assessment and Plan.     27 y.o. female to establish care and discuss the followin. Exercise-induced asthma  Chronic, stable.  No recent exacerbations.  Have sent a refill on rescue Hailer.  - albuterol 108 (90 " Base) MCG/ACT Aero Soln inhalation aerosol; Inhale 1-2 Puffs every 6 hours as needed for Shortness of Breath.  Dispense: 8.5 g; Refill: 11    2. Nausea and vomiting during pregnancy  Ongoing.  Less severe than her first pregnancy.  Discussed the importance of maintaining adequate hydration and nutrition, small meals.  Patient is very familiar with this.  Continue doxylamine/pryidoxine recommend using Zofran sparingly given pregnancy category.    3. 11 weeks gestation of pregnancy  Continue prenatals-keep appointment Monday for OB/GYN appointment.    4. Constipation, unspecified constipation type  Acute problem.  Recommend Metamucil daily, increase hydration, fiber rich foods.    5. Attention deficit hyperactivity disorder (ADHD), predominantly inattentive type  Recently completed formal diagnosis back in April.  Currently pregnant therefore pharmacological therapy will be held until after pregnancy.    6. Generalized anxiety disorder  Chronic problem.  Stable.  Currently not on any medication for this due to pregnancy status.  Historically sertraline was ineffective.  Continue counseling.    7. Mood disorder (HCC)  Chronic problem.  Currently not on any medication at this time due to pregnancy.  Historically sertraline was ineffective for her.  Continue counseling.    8. Encounter to establish care        Return for Annual Preventative Exam.          Radha RANDHAWA

## 2023-06-26 ENCOUNTER — HOSPITAL ENCOUNTER (OUTPATIENT)
Dept: LAB | Facility: MEDICAL CENTER | Age: 28
End: 2023-06-26
Attending: OBSTETRICS & GYNECOLOGY
Payer: COMMERCIAL

## 2023-06-26 LAB
ABO GROUP BLD: NORMAL
BASOPHILS # BLD AUTO: 0.5 % (ref 0–1.8)
BASOPHILS # BLD: 0.04 K/UL (ref 0–0.12)
BLD GP AB SCN SERPL QL: NORMAL
EOSINOPHIL # BLD AUTO: 0.11 K/UL (ref 0–0.51)
EOSINOPHIL NFR BLD: 1.3 % (ref 0–6.9)
ERYTHROCYTE [DISTWIDTH] IN BLOOD BY AUTOMATED COUNT: 40.2 FL (ref 35.9–50)
HBV SURFACE AG SER QL: NORMAL
HCT VFR BLD AUTO: 38.7 % (ref 37–47)
HCV AB SER QL: NORMAL
HGB BLD-MCNC: 13.4 G/DL (ref 12–16)
HIV 1+2 AB+HIV1 P24 AG SERPL QL IA: NORMAL
IMM GRANULOCYTES # BLD AUTO: 0.04 K/UL (ref 0–0.11)
IMM GRANULOCYTES NFR BLD AUTO: 0.5 % (ref 0–0.9)
LYMPHOCYTES # BLD AUTO: 1.23 K/UL (ref 1–4.8)
LYMPHOCYTES NFR BLD: 14.9 % (ref 22–41)
MCH RBC QN AUTO: 30.2 PG (ref 27–33)
MCHC RBC AUTO-ENTMCNC: 34.6 G/DL (ref 32.2–35.5)
MCV RBC AUTO: 87.4 FL (ref 81.4–97.8)
MONOCYTES # BLD AUTO: 0.5 K/UL (ref 0–0.85)
MONOCYTES NFR BLD AUTO: 6.1 % (ref 0–13.4)
NEUTROPHILS # BLD AUTO: 6.31 K/UL (ref 1.82–7.42)
NEUTROPHILS NFR BLD: 76.7 % (ref 44–72)
NRBC # BLD AUTO: 0 K/UL
NRBC BLD-RTO: 0 /100 WBC (ref 0–0.2)
PLATELET # BLD AUTO: 249 K/UL (ref 164–446)
PMV BLD AUTO: 10.3 FL (ref 9–12.9)
RBC # BLD AUTO: 4.43 M/UL (ref 4.2–5.4)
RH BLD: NORMAL
RUBV AB SER QL: 94.2 IU/ML
T PALLIDUM AB SER QL IA: NORMAL
WBC # BLD AUTO: 8.2 K/UL (ref 4.8–10.8)

## 2023-06-26 PROCEDURE — 88175 CYTOPATH C/V AUTO FLUID REDO: CPT

## 2023-06-26 PROCEDURE — 86900 BLOOD TYPING SEROLOGIC ABO: CPT

## 2023-06-26 PROCEDURE — 87491 CHLMYD TRACH DNA AMP PROBE: CPT

## 2023-06-26 PROCEDURE — 86780 TREPONEMA PALLIDUM: CPT

## 2023-06-26 PROCEDURE — 85025 COMPLETE CBC W/AUTO DIFF WBC: CPT

## 2023-06-26 PROCEDURE — 86850 RBC ANTIBODY SCREEN: CPT

## 2023-06-26 PROCEDURE — 87591 N.GONORRHOEAE DNA AMP PROB: CPT

## 2023-06-26 PROCEDURE — 87389 HIV-1 AG W/HIV-1&-2 AB AG IA: CPT

## 2023-06-26 PROCEDURE — 87340 HEPATITIS B SURFACE AG IA: CPT

## 2023-06-26 PROCEDURE — 86762 RUBELLA ANTIBODY: CPT

## 2023-06-26 PROCEDURE — 86901 BLOOD TYPING SEROLOGIC RH(D): CPT

## 2023-06-26 PROCEDURE — 87077 CULTURE AEROBIC IDENTIFY: CPT

## 2023-06-26 PROCEDURE — 36415 COLL VENOUS BLD VENIPUNCTURE: CPT

## 2023-06-26 PROCEDURE — 86803 HEPATITIS C AB TEST: CPT

## 2023-06-26 PROCEDURE — 87086 URINE CULTURE/COLONY COUNT: CPT

## 2023-06-27 ENCOUNTER — PHARMACY VISIT (OUTPATIENT)
Dept: PHARMACY | Facility: MEDICAL CENTER | Age: 28
End: 2023-06-27
Payer: COMMERCIAL

## 2023-06-27 LAB
C TRACH DNA GENITAL QL NAA+PROBE: NEGATIVE
CYTOLOGY REG CYTOL: NORMAL
N GONORRHOEA DNA GENITAL QL NAA+PROBE: NEGATIVE
SPECIMEN SOURCE: NORMAL

## 2023-06-29 LAB
BACTERIA UR CULT: ABNORMAL
BACTERIA UR CULT: ABNORMAL
SIGNIFICANT IND 70042: ABNORMAL
SITE SITE: ABNORMAL
SOURCE SOURCE: ABNORMAL

## 2023-07-13 ENCOUNTER — OFFICE VISIT (OUTPATIENT)
Dept: BEHAVIORAL HEALTH | Facility: CLINIC | Age: 28
End: 2023-07-13
Payer: COMMERCIAL

## 2023-07-13 DIAGNOSIS — F41.1 GENERALIZED ANXIETY DISORDER: ICD-10-CM

## 2023-07-13 DIAGNOSIS — F90.0 ATTENTION DEFICIT HYPERACTIVITY DISORDER (ADHD), PREDOMINANTLY INATTENTIVE TYPE: ICD-10-CM

## 2023-07-13 PROCEDURE — 90837 PSYTX W PT 60 MINUTES: CPT | Performed by: MARRIAGE & FAMILY THERAPIST

## 2023-07-13 NOTE — LETTER
The following plan is in draft form.  Please refer to the current version for the most up-to-date information.                [facility] - Out[facility]   Effective from: 2023  Effective to: 2024    Draft  Plan ID: [id number]               Participants       You: Baylee    Your team: Therapist CHLOE Bains (Therapist)          Patient Demographics       Patient Name  Baylee Ceballos Legal Sex  Female   1995 N  [id number] Address  [address] [place] [address] [address] [address] Phone  [phone] (Home)  [phone] (Mobile) *Preferred*          Problem List as of 2023 Date Reviewed: 2023            ICD-10-CM Priority Class Noted - Resolved    RESOLVED: Urticaria L50.9   2021 - 2022    Exercise-induced asthma J45.990   2022 - Present    RESOLVED: Healthcare maintenance Z00.00   2022 - 2022    Allergies T78.40XA   2022 - Present    Mood disorder (HCC) F39   2022 - Present    Psoriasis L40.9   2022 - Present    Generalized anxiety disorder F41.1   5/3/2022 - Present    RESOLVED: Skin rash R21   2022 - 2022    RESOLVED: Uses birth control Z78.9   2022 - 2023    Attention deficit hyperactivity disorder (ADHD), predominantly inattentive type F90.0   2023 - Present    Nausea and vomiting during pregnancy O21.9   2023 - Present     Current Medications            albuterol 108 (90 Base) MCG/ACT Aero Soln inhalation aerosol    Doxylamine-Pyridoxine 10-10 MG Tablet Delayed Response delayed-release tablet    ondansetron (ZOFRAN ODT) 4 MG TABLET DISPERSIBLE    cetirizine (ZYRTEC) 10 MG Tab          Treatment Plan Note       Therapist CHLOE Bains Last edited by Therapist CHLOE Bains on 2023  6:48 PM         Discussed therapeutic goals and course of treatment with patient to develop treatment plan.         Care Plan: Anxiety       Problem: Anxiety       Long-Range Goal: Reduce overall tension and  anxiety       Short-Term Goal: Verbalize understanding of thoughts, feelings, and behavior components of anxiety and treatment       Intervention: (Clinician) will teach 3 schemas       Responsible User: CHLOE Bains              Intervention: (Clinician) will teach 3 ANTs (Automatic Negative Thoughts)       Responsible User: CHLOE Bains              Intervention: (Clinician) will teach about the connection between thoughts, feelings, and behavior       Responsible User: CHLOE Bains              Intervention: (Clinician) will discuss Hypothetical Event Worry       Responsible User: CHLOE Bains              Intervention: (Clinician) will discuss the mental avoidance in worry       Responsible User: CHLOE Bains              Intervention: (Clinician) will discuss the importance of recognizing problems before they occur, seeing problems as  part of a normal life and review problem solving skills       Responsible User: CHLOE Bains                      Short-Term Goal: Learn and implement coping skills to increase tolerance to anxiety       Intervention: (Clinician) will teach 3 coping skills and encourage patient to practice daily       Responsible User: CHLOE Bains              Intervention: (Clinician) will teach 3 Cognitive Behavioral Therapy skills       Responsible User: CHLOE Bains              Intervention: (Clinician) will teach 3 grounding exercises       Responsible User: CHLOE Bains              Intervention: (Clinician) will teach 3 relaxation techniques       Responsible User: CHLOE Bains              Intervention: (Clinician) will help patient recognize and overcome intolerance of uncertainty       Responsible User: CHLOE Bains                      Short-Term Goal: Verbalize understanding of the role cognitive biases play in irrational worry       Intervention: (Clinician) will teach  about Real Event Worry       Responsible User: CHLOE Bains              Intervention: (Clinician) will discuss Hypothetical Event Worry       Responsible User: CHLOE Bains              Intervention: (Clinician) will help patient identify two automatic thoughts and identify challenges       Responsible User: CHLOE Bains                      Short-Term Goal: Client will be able to identify emotional and physiological signs of anxiety               Short-Term Goal: Client will be able to accurately rate on a point scale the level of anxiety they are experiencing       Intervention: (Clinician) will discuss with patient what they worried about this week and the impact it had on feelings and behavior       Responsible User: CHLOE Bains              Intervention: (Clinician) will teach about mindfulness       Responsible User: CHLOE Bains              Intervention: (Clinician) will encourage worry awareness training       Responsible User: CHLOE Bains                      Short-Term Goal: Recognize and resolve underlying conflicts contributing to anxiety       Intervention: (Clinician) will teach and help implement strategies to limit association between environment and anxiety       Responsible User: CLHOE Bains              Intervention: (Clinician) will help patient to identify core beliefs       Responsible User: CHLOE Bains              Intervention: (Clinician) will discuss the difference between catastrophes and significantly unpleasant events       Responsible User: CHLOE Bains              Intervention: (Clinician) will teach about Real Event Worry       Responsible User: CHLOE Bains              Intervention: (Clinician) will discuss Hypothetical Event Worry       Responsible User: CHLOE Bains              Intervention: (Clinician) will help patient identify 5 underlying conflicts contributing  to anxiety       Responsible User: CHLOE Bains                                            Signatures             Patient:  Baylee Ceballos  Date              Parent / Legal Guardian Signature                    Please Print Name  Relationship to Patient                   Signature  Date

## 2023-07-14 NOTE — PROGRESS NOTES
[facility]   Therapy Progress Note        Name: Baylee Ceballos  MRN: [id number]  : 1995  Age: 28 y.o.  Date of assessment: 2023  PCP: SYDNEE Patrick  Persons in attendance: Patient  Total session time: 55 minutes      Topics addressed in psychotherapy include: Met with the patient in office for an individual counseling session.  The patient reported that recently had a scare with her pregnancy however feels that she has significantly more support during her current pregnancy.  Discussed with the patient her diagnosis of attention deficit hyperactivity disorder and what she could possibly see different from medications.  Worked with patient to identify other stressors.   (Clinician) will work with the patient to identify present stressors       Objective Observations:   Participation:Active verbal participation, Attentive, and Engaged   Grooming:Casual   Cognition:Alert and Fully Oriented   Eye Contact:Good   Mood:Euthymic   Affect:Flexible and Full range   Thought Process:Logical and Goal-directed   Speech:Rate within normal limits and Volume within normal limits    Current Risk:   Suicide: low   Homicide: low   Self-Harm: low   Relapse: low   Safety Plan Reviewed: not applicable    Care Plan Updated: Yes    Does patient express agreement with the above plan? Yes     Diagnosis:  1. Generalized anxiety disorder    2. Attention deficit hyperactivity disorder (ADHD), predominantly inattentive type        Referral appointment(s) scheduled? No       CHLOE Bains

## 2023-07-17 DIAGNOSIS — R11.2 NAUSEA AND VOMITING, UNSPECIFIED VOMITING TYPE: ICD-10-CM

## 2023-07-17 RX ORDER — DOXYLAMINE SUCCINATE AND PYRIDOXINE HYDROCHLORIDE, DELAYED RELEASE TABLETS 10 MG/10 MG 10; 10 MG/1; MG/1
2 TABLET, DELAYED RELEASE ORAL
Qty: 270 TABLET | Refills: 3 | Status: ON HOLD | OUTPATIENT
Start: 2023-07-17 | End: 2023-12-30

## 2023-07-19 ENCOUNTER — PHARMACY VISIT (OUTPATIENT)
Dept: PHARMACY | Facility: MEDICAL CENTER | Age: 28
End: 2023-07-19
Payer: COMMERCIAL

## 2023-07-19 PROCEDURE — RXMED WILLOW AMBULATORY MEDICATION CHARGE: Performed by: NURSE PRACTITIONER

## 2023-07-24 ENCOUNTER — HOSPITAL ENCOUNTER (OUTPATIENT)
Dept: LAB | Facility: MEDICAL CENTER | Age: 28
End: 2023-07-24
Attending: OBSTETRICS & GYNECOLOGY
Payer: COMMERCIAL

## 2023-07-24 PROCEDURE — 82105 ALPHA-FETOPROTEIN SERUM: CPT

## 2023-07-24 PROCEDURE — 36415 COLL VENOUS BLD VENIPUNCTURE: CPT

## 2023-07-27 LAB
# FETUSES US: NORMAL
AFP MOM SERPL: 1.25
AFP SERPL-MCNC: 46 NG/ML
AGE - REPORTED: 28.5 YR
CURRENT SMOKER: NO
FAMILY MEMBER DISEASES HX: NO
GA METHOD: NORMAL
GA: NORMAL WK
IDDM PATIENT QL: NO
INTEGRATED SCN PATIENT-IMP: NORMAL
SPECIMEN DRAWN SERPL: NORMAL

## 2023-08-03 ENCOUNTER — OFFICE VISIT (OUTPATIENT)
Dept: BEHAVIORAL HEALTH | Facility: CLINIC | Age: 28
End: 2023-08-03
Payer: COMMERCIAL

## 2023-08-03 DIAGNOSIS — F90.0 ATTENTION DEFICIT HYPERACTIVITY DISORDER (ADHD), PREDOMINANTLY INATTENTIVE TYPE: ICD-10-CM

## 2023-08-03 DIAGNOSIS — F41.1 GENERALIZED ANXIETY DISORDER: ICD-10-CM

## 2023-08-03 PROCEDURE — 90837 PSYTX W PT 60 MINUTES: CPT | Performed by: MARRIAGE & FAMILY THERAPIST

## 2023-08-03 NOTE — PROGRESS NOTES
[facility]   Therapy Progress Note        Name: Baylee Ceballos  MRN: [id number]  : 1995  Age: 28 y.o.  Date of assessment: 8/3/2023  PCP: SYDNEE Patrick  Persons in attendance: Patient  Total session time: 55 minutes      Topics addressed in psychotherapy include: Met with the patient in office for an individual counseling session.  The patient discussed feeling very tired due to her pregnancy.  Patient discussed her recent vacation to [place] and [place].  She understands our pregnancy effects her energy levels.  Patient discussed managing and maintaining personal boundaries while teaching those to her daughter.  She struggles with family members who want to place all of her information on social media.  Worked on ways to share those boundaries with family members.  Patient discussed some concerns about postpartum depression however feels much more supported at this time.    This dictation has been created using voice recognition software and/or scribes. The accuracy of the dictation is limited by the abilities of the software and the expertise of the scribes. I expect there may be some errors of grammar and possibly content. I made every attempt to manually correct the errors within my dictation. However, errors related to voice recognition software and/or scribes may still exist and should be interpreted within the appropriate context.    Objective Observations:   Participation:Active verbal participation, Attentive, and Engaged   Grooming:Casual   Cognition:Alert and Fully Oriented   Eye Contact:Good   Mood:Euthymic   Affect:Flexible, Full range, and Congruent with content   Thought Process:Logical and Goal-directed   Speech:Rate within normal limits and Volume within normal limits    Current Risk:   Suicide: low   Homicide: low   Self-Harm: low   Relapse: low   Safety Plan Reviewed: not applicable    Care Plan Updated: No    Does patient express agreement with the above  plan? Yes     Diagnosis:  1. Generalized anxiety disorder    2. Attention deficit hyperactivity disorder (ADHD), predominantly inattentive type        Referral appointment(s) scheduled? VAISHALI Choi.

## 2023-09-14 ENCOUNTER — OFFICE VISIT (OUTPATIENT)
Dept: BEHAVIORAL HEALTH | Facility: CLINIC | Age: 28
End: 2023-09-14
Payer: COMMERCIAL

## 2023-09-14 DIAGNOSIS — F41.1 GENERALIZED ANXIETY DISORDER: ICD-10-CM

## 2023-09-14 PROCEDURE — 90837 PSYTX W PT 60 MINUTES: CPT | Performed by: MARRIAGE & FAMILY THERAPIST

## 2023-09-15 NOTE — PROGRESS NOTES
[facility]   Therapy Progress Note        Name: Baylee Ceballos  MRN: [id number]  : 1995  Age: 28 y.o.  Date of assessment: 2023  PCP: SYDNEE Patrick  Persons in attendance: Patient  Total session time: 57 minutes      Topics addressed in psychotherapy include: Met with the patient in office for an individual counseling session.  The patient discussed the differences  about her pregnancy with her current  and previous .  She discussed the supportive attitude she feels.  Patient compared herself to other individuals who appear to accomplish more during later stages of pregnancy.  Worked with the patient to understand individual differences and risks with the pregnancy.  Patient discussed the control her ex  displays regarding visitations with their daughter.  Worked with the patient to identify and understand different life stages.  This dictation has been created using voice recognition software and/or scribes. The accuracy of the dictation is limited by the abilities of the software and the expertise of the scribes. I expect there may be some errors of grammar and possibly content. I made every attempt to manually correct the errors within my dictation. However, errors related to voice recognition software and/or scribes may still exist and should be interpreted within the appropriate context.    Objective Observations:   Participation:Active verbal participation, Attentive, and Engaged   Grooming:Casual   Cognition:Alert and Fully Oriented   Eye Contact:Good   Mood:Euthymic   Affect:Flexible, Full range, and Congruent with content   Thought Process:Logical and Goal-directed   Speech:Rate within normal limits and Volume within normal limits    Current Risk:   Suicide: low   Homicide: low   Self-Harm: low   Relapse: low   Safety Plan Reviewed: not applicable    Care Plan Updated: No    Does patient express agreement with the above plan? Yes      Diagnosis:  1. Generalized anxiety disorder        Referral appointment(s) scheduled? No       VAISHALI Bains.

## 2023-09-19 ENCOUNTER — HOSPITAL ENCOUNTER (OUTPATIENT)
Dept: LAB | Facility: MEDICAL CENTER | Age: 28
End: 2023-09-19
Attending: OBSTETRICS & GYNECOLOGY
Payer: COMMERCIAL

## 2023-09-19 LAB
GLUCOSE 1H P 50 G GLC PO SERPL-MCNC: 153 MG/DL (ref 70–139)
HCT VFR BLD AUTO: 36.9 % (ref 37–47)
HGB BLD-MCNC: 12 G/DL (ref 12–16)
PLATELET # BLD AUTO: 234 K/UL (ref 164–446)
T PALLIDUM AB SER QL IA: NORMAL

## 2023-09-19 PROCEDURE — 82950 GLUCOSE TEST: CPT

## 2023-09-19 PROCEDURE — 85049 AUTOMATED PLATELET COUNT: CPT

## 2023-09-19 PROCEDURE — 85018 HEMOGLOBIN: CPT

## 2023-09-19 PROCEDURE — 86780 TREPONEMA PALLIDUM: CPT

## 2023-09-19 PROCEDURE — 36415 COLL VENOUS BLD VENIPUNCTURE: CPT

## 2023-09-19 PROCEDURE — 85014 HEMATOCRIT: CPT

## 2023-09-27 ENCOUNTER — HOSPITAL ENCOUNTER (OUTPATIENT)
Dept: LAB | Facility: MEDICAL CENTER | Age: 28
End: 2023-09-27
Attending: OBSTETRICS & GYNECOLOGY
Payer: COMMERCIAL

## 2023-09-27 LAB
GLUCOSE 1H P CHAL SERPL-MCNC: 140 MG/DL (ref 65–180)
GLUCOSE 2H P CHAL SERPL-MCNC: 188 MG/DL (ref 65–155)
GLUCOSE 3H P CHAL SERPL-MCNC: 155 MG/DL (ref 65–140)
GLUCOSE BS SERPL-MCNC: 78 MG/DL (ref 65–95)

## 2023-09-27 PROCEDURE — 82952 GTT-ADDED SAMPLES: CPT

## 2023-09-27 PROCEDURE — 82951 GLUCOSE TOLERANCE TEST (GTT): CPT

## 2023-09-27 PROCEDURE — 36415 COLL VENOUS BLD VENIPUNCTURE: CPT

## 2023-09-28 ENCOUNTER — OFFICE VISIT (OUTPATIENT)
Dept: BEHAVIORAL HEALTH | Facility: CLINIC | Age: 28
End: 2023-09-28
Payer: COMMERCIAL

## 2023-09-28 DIAGNOSIS — F90.0 ATTENTION DEFICIT HYPERACTIVITY DISORDER (ADHD), PREDOMINANTLY INATTENTIVE TYPE: ICD-10-CM

## 2023-09-28 DIAGNOSIS — F41.1 GENERALIZED ANXIETY DISORDER: ICD-10-CM

## 2023-09-28 PROCEDURE — 90837 PSYTX W PT 60 MINUTES: CPT | Performed by: MARRIAGE & FAMILY THERAPIST

## 2023-09-29 NOTE — PROGRESS NOTES
[facility]   Therapy Progress Note        Name: Baylee Ceballos  MRN: [id number]  : 1995  Age: 28 y.o.  Date of assessment: 2023  PCP: SYDNEE Patrick  Persons in attendance: Patient  Total session time: 56 minutes      Topics addressed in psychotherapy include: Met with the patient in office for an individual counseling session.  The patient appears to display a good response in treatment and managing her moods.  The symptoms, as noted, appear to be improving.      Behavior:  The patient was open and honest during session.     Content of Therapy:   The patient discussed some concerns regarding prenatal diabetes test results.  If patient discussed boundaries that she will hold regarding her  and family members.    Therapeutic Intervention:   This session, the therapeutic focus, was on supporting the patient in her boundary setting with family members.  Discussed the acceptance of her decisions despite differing views from family members.    This dictation has been created using voice recognition software and/or scribes. The accuracy of the dictation is limited by the abilities of the software and the expertise of the scribes. I expect there may be some errors of grammar and possibly content. I made every attempt to manually correct the errors within my dictation. However, errors related to voice recognition software and/or scribes may still exist and should be interpreted within the appropriate context.    Objective Observations:   Participation:Active verbal participation, Attentive, and Engaged   Grooming:Casual   Cognition:Alert and Fully Oriented   Eye Contact:Good   Mood:Euthymic   Affect:Flexible and Full range   Thought Process:Logical and Goal-directed   Speech:Rate within normal limits and Volume within normal limits    Current Risk:   Suicide: low   Homicide: low   Self-Harm: low   Relapse: low   Safety Plan Reviewed: not applicable    Care Plan Updated:  No    Does patient express agreement with the above plan? Yes     Diagnosis:  1. Generalized anxiety disorder    2. Attention deficit hyperactivity disorder (ADHD), predominantly inattentive type        Referral appointment(s) scheduled? No       VAISHALI Bains.

## 2023-10-02 ENCOUNTER — PHARMACY VISIT (OUTPATIENT)
Dept: PHARMACY | Facility: MEDICAL CENTER | Age: 28
End: 2023-10-02
Payer: COMMERCIAL

## 2023-10-02 DIAGNOSIS — O99.810 IMPAIRED GLUCOSE TOLERANCE DURING PREGNANCY: ICD-10-CM

## 2023-10-02 PROCEDURE — RXOTC WILLOW AMBULATORY OTC CHARGE: Performed by: PHARMACIST

## 2023-10-02 PROCEDURE — RXMED WILLOW AMBULATORY MEDICATION CHARGE: Performed by: NURSE PRACTITIONER

## 2023-10-02 RX ORDER — LANCETS 33 GAUGE
EACH MISCELLANEOUS
Qty: 400 EACH | Refills: 2 | Status: SHIPPED | OUTPATIENT
Start: 2023-10-02 | End: 2024-01-16

## 2023-10-02 RX ORDER — BLOOD-GLUCOSE METER
EACH MISCELLANEOUS
Qty: 1 KIT | Refills: 0 | Status: SHIPPED | OUTPATIENT
Start: 2023-10-02 | End: 2024-01-16

## 2023-10-02 RX ORDER — BLOOD SUGAR DIAGNOSTIC
STRIP MISCELLANEOUS
Qty: 400 STRIP | Refills: 3 | Status: ON HOLD | OUTPATIENT
Start: 2023-10-02 | End: 2023-12-30

## 2023-10-06 ENCOUNTER — OFFICE VISIT (OUTPATIENT)
Dept: URGENT CARE | Facility: CLINIC | Age: 28
End: 2023-10-06
Payer: COMMERCIAL

## 2023-10-06 VITALS
WEIGHT: 160 LBS | OXYGEN SATURATION: 98 % | SYSTOLIC BLOOD PRESSURE: 124 MMHG | HEART RATE: 76 BPM | BODY MASS INDEX: 28.35 KG/M2 | TEMPERATURE: 97.8 F | DIASTOLIC BLOOD PRESSURE: 80 MMHG | RESPIRATION RATE: 15 BRPM | HEIGHT: 63 IN

## 2023-10-06 DIAGNOSIS — U07.1 COVID: ICD-10-CM

## 2023-10-06 DIAGNOSIS — R09.81 NASAL CONGESTION: ICD-10-CM

## 2023-10-06 DIAGNOSIS — J02.9 SORE THROAT: ICD-10-CM

## 2023-10-06 DIAGNOSIS — R52 BODY ACHES: ICD-10-CM

## 2023-10-06 LAB
FLUAV RNA SPEC QL NAA+PROBE: NEGATIVE
FLUBV RNA SPEC QL NAA+PROBE: NEGATIVE
RSV RNA SPEC QL NAA+PROBE: NEGATIVE
SARS-COV-2 RNA RESP QL NAA+PROBE: POSITIVE

## 2023-10-06 PROCEDURE — 0241U POCT CEPHEID COV-2, FLU A/B, RSV - PCR: CPT

## 2023-10-06 PROCEDURE — 3074F SYST BP LT 130 MM HG: CPT

## 2023-10-06 PROCEDURE — 99213 OFFICE O/P EST LOW 20 MIN: CPT

## 2023-10-06 PROCEDURE — 3079F DIAST BP 80-89 MM HG: CPT

## 2023-10-06 ASSESSMENT — FIBROSIS 4 INDEX: FIB4 SCORE: 0.54

## 2023-10-06 NOTE — PROGRESS NOTES
"Subjective     Baylee Traci Ceballos is a 28 y.o. female who presents with Coronavirus Screening            HPI patient comes in today due to starting to have some nasal congestion, body aches and a slight sore throat.  She states her symptoms mostly came on overnight.  She denies any fevers, chills  She denies any cough  She is 27 weeks pregnant, she uses Zofran for intermittent nausea  She states her  is a local MD and he has been home and COVID-positive since Monday.  She is concerned that she may have gotten COVID from this week.    ROS same as above           Objective     /80 (BP Location: Right arm, Patient Position: Sitting, BP Cuff Size: Adult long)   Pulse 76   Temp 36.6 °C (97.8 °F) (Temporal)   Resp 15   Ht 1.6 m (5' 3\")   Wt 72.6 kg (160 lb)   SpO2 98%   BMI 28.34 kg/m²      Physical Exam  Constitutional:       Appearance: Normal appearance. She is not ill-appearing.   HENT:      Head: Normocephalic and atraumatic.      Right Ear: Tympanic membrane, ear canal and external ear normal.      Left Ear: Tympanic membrane, ear canal and external ear normal.      Nose: Congestion present.      Mouth/Throat:      Mouth: Mucous membranes are moist.      Pharynx: Posterior oropharyngeal erythema present. No oropharyngeal exudate.   Eyes:      Pupils: Pupils are equal, round, and reactive to light.   Cardiovascular:      Rate and Rhythm: Normal rate and regular rhythm.      Heart sounds: Normal heart sounds. No murmur heard.  Pulmonary:      Effort: Pulmonary effort is normal.      Breath sounds: No stridor. No wheezing, rhonchi or rales.   Musculoskeletal:         General: Normal range of motion.      Cervical back: Normal range of motion and neck supple.   Lymphadenopathy:      Head:      Right side of head: Tonsillar adenopathy present. No submental, submandibular, preauricular, posterior auricular or occipital adenopathy.      Left side of head: Tonsillar adenopathy present. No submental, " submandibular, preauricular, posterior auricular or occipital adenopathy.      Cervical: Cervical adenopathy present.      Right cervical: Superficial cervical adenopathy present. No deep or posterior cervical adenopathy.     Left cervical: Superficial cervical adenopathy present. No deep or posterior cervical adenopathy.   Skin:     General: Skin is warm and dry.   Neurological:      Mental Status: She is alert and oriented to person, place, and time.           Assessment & Plan      Patient comes in today with symptoms of nasal congestion, sore throat, body aches.  She states her  has been home a week positive with COVID and she is concerned that she has contracted it.  Her symptoms at this point are mild      On exam tympanic membranes are pearly white bilaterally.  There is nasal congestion.  There is mild erythema in the posterior pharynx, no exudates, uvula remains midline, no edema.  There is tonsillar and superficial cervical adenopathy bilaterally, nontender to palpation.  Lung sounds are clear, no wheezing, no rhonchi, SPO2 98% clinic today.  Heart sounds are normal, regular rhythm, no murmur  Discussed point-of-care testing in clinic today for COVID/flu/RSV.  Discussed we will update her once results are available via [employer].  Discussed CDC guidelines for isolating.  Discussed continued use of OTC Tylenol as directed, warm salt water gargles, warm and cool soothing liquids, humidifier, warm steamy showers for symptom management.  Patient verbalized understanding and agreement plan of care. Differential diagnosis, natural history, supportive care, and indications for immediate follow-up were discussed.      1. COVID        2. Nasal congestion  POCT CEPHEID COV-2, FLU A/B, RSV - PCR      3. Sore throat  POCT CEPHEID COV-2, FLU A/B, RSV - PCR      4. Body aches  POCT CEPHEID COV-2, FLU A/B, RSV - PCR

## 2023-10-10 ENCOUNTER — APPOINTMENT (OUTPATIENT)
Dept: BEHAVIORAL HEALTH | Facility: CLINIC | Age: 28
End: 2023-10-10
Payer: COMMERCIAL

## 2023-10-30 PROCEDURE — RXMED WILLOW AMBULATORY MEDICATION CHARGE: Performed by: NURSE PRACTITIONER

## 2023-10-30 RX ORDER — KETOCONAZOLE 20 MG/G
CREAM TOPICAL
Qty: 60 G | Refills: 0 | Status: ON HOLD | OUTPATIENT
Start: 2023-10-30 | End: 2023-12-30

## 2023-10-31 ENCOUNTER — PHARMACY VISIT (OUTPATIENT)
Dept: PHARMACY | Facility: MEDICAL CENTER | Age: 28
End: 2023-10-31
Payer: COMMERCIAL

## 2023-11-02 ENCOUNTER — OFFICE VISIT (OUTPATIENT)
Dept: BEHAVIORAL HEALTH | Facility: CLINIC | Age: 28
End: 2023-11-02
Payer: COMMERCIAL

## 2023-11-02 DIAGNOSIS — F90.0 ATTENTION DEFICIT HYPERACTIVITY DISORDER (ADHD), PREDOMINANTLY INATTENTIVE TYPE: ICD-10-CM

## 2023-11-02 DIAGNOSIS — F41.1 GENERALIZED ANXIETY DISORDER: ICD-10-CM

## 2023-11-02 PROCEDURE — 90837 PSYTX W PT 60 MINUTES: CPT | Performed by: MARRIAGE & FAMILY THERAPIST

## 2023-11-02 NOTE — PROGRESS NOTES
[facility]   Therapy Progress Note        Name: Baylee Ceballos  MRN: [id number]  : 1995  Age: 28 y.o.  Date of assessment: 2023  PCP: SYDNEE Patrick  Persons in attendance: Patient  Total session time: 55 minutes      Topics addressed in psychotherapy include: Met with the patient in office for an individual counseling session.      Behavior:  The patient was open and honest during session.      Content of Therapy:   The patient discussed that she is experiencing some anxiety at this stage of her pregnancy.  Patient discussed her prior pregnancy and the differences in her current situation.  Patient discussed preparations for the baby's birth at the end of December or early January.    Therapeutic Intervention:   This session, the therapeutic focus, was on supporting the patient's thought processes and emotional stability.  Assisted the patient in processing the differences in her pregnancies and the support she is receiving.      This dictation has been created using voice recognition software and/or scribes. The accuracy of the dictation is limited by the abilities of the software and the expertise of the scribes. I expect there may be some errors of grammar and possibly content. I made every attempt to manually correct the errors within my dictation. However, errors related to voice recognition software and/or scribes may still exist and should be interpreted within the appropriate context.    Objective Observations:   Participation:Active verbal participation, Attentive, and Engaged   Grooming:Casual   Cognition:Alert and Fully Oriented   Eye Contact:Good   Mood:Anxious   Affect:Flexible and Full range   Thought Process:Logical and Goal-directed   Speech:Rate within normal limits and Volume within normal limits    Current Risk:   Suicide: low   Homicide: low   Self-Harm: low   Relapse: low   Safety Plan Reviewed: not applicable    Care Plan Updated: No    Does  patient express agreement with the above plan? Yes     Diagnosis:  1. Generalized anxiety disorder    2. Attention deficit hyperactivity disorder (ADHD), predominantly inattentive type        Referral appointment(s) scheduled? No       VAISHALI Bains.

## 2023-11-14 ENCOUNTER — OFFICE VISIT (OUTPATIENT)
Dept: BEHAVIORAL HEALTH | Facility: CLINIC | Age: 28
End: 2023-11-14
Payer: COMMERCIAL

## 2023-11-14 DIAGNOSIS — F41.1 GENERALIZED ANXIETY DISORDER: ICD-10-CM

## 2023-11-14 DIAGNOSIS — F90.0 ATTENTION DEFICIT HYPERACTIVITY DISORDER (ADHD), PREDOMINANTLY INATTENTIVE TYPE: ICD-10-CM

## 2023-11-14 PROCEDURE — 90837 PSYTX W PT 60 MINUTES: CPT | Performed by: MARRIAGE & FAMILY THERAPIST

## 2023-11-15 NOTE — PROGRESS NOTES
[facility]   Therapy Progress Note        Name: Baylee Ceballos  MRN: [id number]  : 1995  Age: 28 y.o.  Date of assessment: 2023  PCP: SYDNEE Patrick  Persons in attendance: Patient  Total session time: 55 minutes      Topics addressed in psychotherapy include: Met with the patient in office for an individual counseling session.      Behavior:  The patient was open and honest during session.      Content of Therapy:   The patient discussed that she has some concerns about her daughter's recent bed wetting.  Patient discussed how her parenting style of differs from her parents' or from her in laws.  The patient discussed possible stressors and conversations which she has had with very year old daughter.    Therapeutic Intervention:   This session, the therapeutic focus, was on if reinforcing patients parenting and healthy behavior patterns.  Therapist supported the patient in identifying and expressing concerns.  Explored with patient if alternate parenting styles and techniques to reduce her anxiety.    This dictation has been created using voice recognition software and/or scribes. The accuracy of the dictation is limited by the abilities of the software and the expertise of the scribes. I expect there may be some errors of grammar and possibly content. I made every attempt to manually correct the errors within my dictation. However, errors related to voice recognition software and/or scribes may still exist and should be interpreted within the appropriate context.    Objective Observations:   Participation:Active verbal participation, Attentive, Engaged, and Open to feedback   Grooming:Casual   Cognition:Alert and Fully Oriented   Eye Contact:Good   Mood:Euthymic   Affect:Flexible, Full range, and Congruent with content   Thought Process:Logical and Goal-directed   Speech:Rate within normal limits and Volume within normal limits    Current Risk:   Suicide:  low   Homicide: low   Self-Harm: low   Relapse: low   Safety Plan Reviewed: not applicable    Care Plan Updated: No    Does patient express agreement with the above plan? Yes     Diagnosis:  1. Generalized anxiety disorder    2. Attention deficit hyperactivity disorder (ADHD), predominantly inattentive type        Referral appointment(s) scheduled? VAISHALI Choi.

## 2023-11-28 ENCOUNTER — OFFICE VISIT (OUTPATIENT)
Dept: BEHAVIORAL HEALTH | Facility: CLINIC | Age: 28
End: 2023-11-28
Payer: COMMERCIAL

## 2023-11-28 DIAGNOSIS — F41.1 GENERALIZED ANXIETY DISORDER: ICD-10-CM

## 2023-11-28 DIAGNOSIS — F90.0 ATTENTION DEFICIT HYPERACTIVITY DISORDER (ADHD), PREDOMINANTLY INATTENTIVE TYPE: ICD-10-CM

## 2023-11-28 PROCEDURE — 90837 PSYTX W PT 60 MINUTES: CPT | Performed by: MARRIAGE & FAMILY THERAPIST

## 2023-11-28 NOTE — PROGRESS NOTES
[facility]   Therapy Progress Note        Name: Baylee Ceballos  MRN: [id number]  : 1995  Age: 28 y.o.  Date of assessment: 2023  PCP: SYDNEE Patrick  Persons in attendance: Patient  Total session time: 56 minutes      Topics addressed in psychotherapy include: Met with the patient in office for an individual counseling session.      Behavior:  The patient was open and honest during session.  Patient appeared calm and relaxed during session.    Content of Therapy:   The patient discussed that she is planning on being induced into labor on .  Patient discussed that she is not currently taking her anti depressants and would like to consider adhd meds after her son is born.  The patient discussed that she has no expectations for family members this helps in preventing her from being hurt when they do not fulfill those expectations.    Therapeutic Intervention:   This session, the therapeutic focus, was on addressing the patients positive emotional regulation during her pregnancy.  Discussed having the positive goal in outlook towards her son being born.  Discussed not having expectations for family members as a defense mechanism to protect from hurt.  Discussed with the patient potential pathways for personal growth after the birth of her son.  Provided supportive psychotherapy.    This dictation has been created using voice recognition software and/or scribes. The accuracy of the dictation is limited by the abilities of the software and the expertise of the scribes. I expect there may be some errors of grammar and possibly content. I made every attempt to manually correct the errors within my dictation. However, errors related to voice recognition software and/or scribes may still exist and should be interpreted within the appropriate context.    Objective Observations:   Participation:Active verbal participation, Attentive, Engaged, and Open to  feedback   Grooming:Casual   Cognition:Alert and Fully Oriented   Eye Contact:Good   Mood:Euthymic and Happy   Affect:Flexible, Full range, and Congruent with content   Thought Process:Logical and Goal-directed   Speech:Rate within normal limits and Volume within normal limits    Current Risk:   Suicide: low   Homicide: low   Self-Harm: low   Relapse: low   Safety Plan Reviewed: not applicable    Care Plan Updated: No    Does patient express agreement with the above plan? Yes     Diagnosis:  1. Generalized anxiety disorder    2. Attention deficit hyperactivity disorder (ADHD), predominantly inattentive type        Referral appointment(s) scheduled? No       CHLOE Bains

## 2023-11-30 ENCOUNTER — HOSPITAL ENCOUNTER (OUTPATIENT)
Dept: LAB | Facility: MEDICAL CENTER | Age: 28
End: 2023-11-30
Attending: OBSTETRICS & GYNECOLOGY
Payer: COMMERCIAL

## 2023-11-30 PROCEDURE — 87150 DNA/RNA AMPLIFIED PROBE: CPT

## 2023-11-30 PROCEDURE — 87081 CULTURE SCREEN ONLY: CPT

## 2023-12-01 LAB — GP B STREP DNA SPEC QL NAA+PROBE: NEGATIVE

## 2023-12-07 ENCOUNTER — PHARMACY VISIT (OUTPATIENT)
Dept: PHARMACY | Facility: MEDICAL CENTER | Age: 28
End: 2023-12-07
Payer: COMMERCIAL

## 2023-12-07 PROCEDURE — RXMED WILLOW AMBULATORY MEDICATION CHARGE: Performed by: INTERNAL MEDICINE

## 2023-12-07 RX ORDER — RESPIRATORY SYNCYTIAL VIRUS VACCINE 120MCG/0.5
KIT INTRAMUSCULAR
Qty: 0.5 ML | Refills: 0 | OUTPATIENT
Start: 2023-12-07

## 2023-12-29 ENCOUNTER — HOSPITAL ENCOUNTER (INPATIENT)
Facility: MEDICAL CENTER | Age: 28
LOS: 1 days | End: 2023-12-30
Attending: OBSTETRICS & GYNECOLOGY | Admitting: OBSTETRICS & GYNECOLOGY
Payer: COMMERCIAL

## 2023-12-29 LAB
BASOPHILS # BLD AUTO: 0.3 % (ref 0–1.8)
BASOPHILS # BLD: 0.04 K/UL (ref 0–0.12)
EOSINOPHIL # BLD AUTO: 0.1 K/UL (ref 0–0.51)
EOSINOPHIL NFR BLD: 0.8 % (ref 0–6.9)
ERYTHROCYTE [DISTWIDTH] IN BLOOD BY AUTOMATED COUNT: 43.2 FL (ref 35.9–50)
HCT VFR BLD AUTO: 34.3 % (ref 37–47)
HGB BLD-MCNC: 11.3 G/DL (ref 12–16)
HOLDING TUBE BB 8507: NORMAL
IMM GRANULOCYTES # BLD AUTO: 0.06 K/UL (ref 0–0.11)
IMM GRANULOCYTES NFR BLD AUTO: 0.5 % (ref 0–0.9)
LYMPHOCYTES # BLD AUTO: 1.16 K/UL (ref 1–4.8)
LYMPHOCYTES NFR BLD: 9.7 % (ref 22–41)
MCH RBC QN AUTO: 27.3 PG (ref 27–33)
MCHC RBC AUTO-ENTMCNC: 32.9 G/DL (ref 32.2–35.5)
MCV RBC AUTO: 82.9 FL (ref 81.4–97.8)
MONOCYTES # BLD AUTO: 0.66 K/UL (ref 0–0.85)
MONOCYTES NFR BLD AUTO: 5.5 % (ref 0–13.4)
NEUTROPHILS # BLD AUTO: 9.95 K/UL (ref 1.82–7.42)
NEUTROPHILS NFR BLD: 83.2 % (ref 44–72)
NRBC # BLD AUTO: 0 K/UL
NRBC BLD-RTO: 0 /100 WBC (ref 0–0.2)
PLATELET # BLD AUTO: 192 K/UL (ref 164–446)
PMV BLD AUTO: 12.7 FL (ref 9–12.9)
RBC # BLD AUTO: 4.14 M/UL (ref 4.2–5.4)
T PALLIDUM AB SER QL IA: NORMAL
WBC # BLD AUTO: 12 K/UL (ref 4.8–10.8)

## 2023-12-29 PROCEDURE — 304965 HCHG RECOVERY SERVICES

## 2023-12-29 PROCEDURE — 36415 COLL VENOUS BLD VENIPUNCTURE: CPT

## 2023-12-29 PROCEDURE — A9270 NON-COVERED ITEM OR SERVICE: HCPCS | Performed by: OBSTETRICS & GYNECOLOGY

## 2023-12-29 PROCEDURE — 700102 HCHG RX REV CODE 250 W/ 637 OVERRIDE(OP): Performed by: OBSTETRICS & GYNECOLOGY

## 2023-12-29 PROCEDURE — 85025 COMPLETE CBC W/AUTO DIFF WBC: CPT

## 2023-12-29 PROCEDURE — 59409 OBSTETRICAL CARE: CPT

## 2023-12-29 PROCEDURE — 700111 HCHG RX REV CODE 636 W/ 250 OVERRIDE (IP)

## 2023-12-29 PROCEDURE — 86780 TREPONEMA PALLIDUM: CPT

## 2023-12-29 PROCEDURE — 770002 HCHG ROOM/CARE - OB PRIVATE (112)

## 2023-12-29 RX ORDER — DOCUSATE SODIUM 100 MG/1
100 CAPSULE, LIQUID FILLED ORAL 2 TIMES DAILY PRN
Status: DISCONTINUED | OUTPATIENT
Start: 2023-12-29 | End: 2023-12-30 | Stop reason: HOSPADM

## 2023-12-29 RX ORDER — METHYLERGONOVINE MALEATE 0.2 MG/ML
0.2 INJECTION INTRAVENOUS
Status: DISCONTINUED | OUTPATIENT
Start: 2023-12-29 | End: 2023-12-30 | Stop reason: HOSPADM

## 2023-12-29 RX ORDER — ACETAMINOPHEN 500 MG
1000 TABLET ORAL
Status: COMPLETED | OUTPATIENT
Start: 2023-12-29 | End: 2023-12-29

## 2023-12-29 RX ORDER — SODIUM CHLORIDE, SODIUM LACTATE, POTASSIUM CHLORIDE, CALCIUM CHLORIDE 600; 310; 30; 20 MG/100ML; MG/100ML; MG/100ML; MG/100ML
INJECTION, SOLUTION INTRAVENOUS CONTINUOUS
Status: DISCONTINUED | OUTPATIENT
Start: 2023-12-29 | End: 2023-12-30 | Stop reason: HOSPADM

## 2023-12-29 RX ORDER — ACETAMINOPHEN 500 MG
1000 TABLET ORAL EVERY 6 HOURS PRN
Status: DISCONTINUED | OUTPATIENT
Start: 2023-12-30 | End: 2023-12-30 | Stop reason: HOSPADM

## 2023-12-29 RX ORDER — IBUPROFEN 800 MG/1
800 TABLET ORAL EVERY 8 HOURS PRN
Status: DISCONTINUED | OUTPATIENT
Start: 2023-12-30 | End: 2023-12-30 | Stop reason: HOSPADM

## 2023-12-29 RX ORDER — CARBOPROST TROMETHAMINE 250 UG/ML
250 INJECTION, SOLUTION INTRAMUSCULAR
Status: DISCONTINUED | OUTPATIENT
Start: 2023-12-29 | End: 2023-12-30 | Stop reason: HOSPADM

## 2023-12-29 RX ORDER — OXYTOCIN 10 [USP'U]/ML
INJECTION, SOLUTION INTRAMUSCULAR; INTRAVENOUS
Status: COMPLETED
Start: 2023-12-29 | End: 2023-12-29

## 2023-12-29 RX ORDER — TERBUTALINE SULFATE 1 MG/ML
0.25 INJECTION, SOLUTION SUBCUTANEOUS
Status: DISCONTINUED | OUTPATIENT
Start: 2023-12-29 | End: 2023-12-29 | Stop reason: HOSPADM

## 2023-12-29 RX ORDER — LIDOCAINE HYDROCHLORIDE 10 MG/ML
20 INJECTION, SOLUTION INFILTRATION; PERINEURAL
Status: DISCONTINUED | OUTPATIENT
Start: 2023-12-29 | End: 2023-12-29 | Stop reason: HOSPADM

## 2023-12-29 RX ORDER — OXYTOCIN 10 [USP'U]/ML
10 INJECTION, SOLUTION INTRAMUSCULAR; INTRAVENOUS
Status: COMPLETED | OUTPATIENT
Start: 2023-12-29 | End: 2023-12-29

## 2023-12-29 RX ORDER — SODIUM CHLORIDE, SODIUM LACTATE, POTASSIUM CHLORIDE, CALCIUM CHLORIDE 600; 310; 30; 20 MG/100ML; MG/100ML; MG/100ML; MG/100ML
INJECTION, SOLUTION INTRAVENOUS PRN
Status: DISCONTINUED | OUTPATIENT
Start: 2023-12-29 | End: 2023-12-30 | Stop reason: HOSPADM

## 2023-12-29 RX ORDER — MISOPROSTOL 200 UG/1
600 TABLET ORAL
Status: DISCONTINUED | OUTPATIENT
Start: 2023-12-29 | End: 2023-12-30 | Stop reason: HOSPADM

## 2023-12-29 RX ORDER — VITAMIN A ACETATE, BETA CAROTENE, ASCORBIC ACID, CHOLECALCIFEROL, .ALPHA.-TOCOPHEROL ACETATE, DL-, THIAMINE MONONITRATE, RIBOFLAVIN, NIACINAMIDE, PYRIDOXINE HYDROCHLORIDE, FOLIC ACID, CYANOCOBALAMIN, CALCIUM CARBONATE, FERROUS FUMARATE, ZINC OXIDE, CUPRIC OXIDE 3080; 12; 120; 400; 1; 1.84; 3; 20; 22; 920; 25; 200; 27; 10; 2 [IU]/1; UG/1; MG/1; [IU]/1; MG/1; MG/1; MG/1; MG/1; MG/1; [IU]/1; MG/1; MG/1; MG/1; MG/1; MG/1
1 TABLET, FILM COATED ORAL
Status: DISCONTINUED | OUTPATIENT
Start: 2023-12-30 | End: 2023-12-30 | Stop reason: HOSPADM

## 2023-12-29 RX ORDER — IBUPROFEN 800 MG/1
800 TABLET ORAL
Status: COMPLETED | OUTPATIENT
Start: 2023-12-29 | End: 2023-12-29

## 2023-12-29 RX ADMIN — IBUPROFEN 800 MG: 800 TABLET, FILM COATED ORAL at 19:37

## 2023-12-29 RX ADMIN — ACETAMINOPHEN 1000 MG: 500 TABLET ORAL at 20:05

## 2023-12-29 RX ADMIN — OXYTOCIN 10 UNITS: 10 INJECTION, SOLUTION INTRAMUSCULAR; INTRAVENOUS at 18:26

## 2023-12-29 ASSESSMENT — PATIENT HEALTH QUESTIONNAIRE - PHQ9
1. LITTLE INTEREST OR PLEASURE IN DOING THINGS: NOT AT ALL
SUM OF ALL RESPONSES TO PHQ9 QUESTIONS 1 AND 2: 0
2. FEELING DOWN, DEPRESSED, IRRITABLE, OR HOPELESS: NOT AT ALL

## 2023-12-29 ASSESSMENT — PAIN DESCRIPTION - PAIN TYPE
TYPE: ACUTE PAIN
TYPE: ACUTE PAIN

## 2023-12-29 ASSESSMENT — FIBROSIS 4 INDEX: FIB4 SCORE: 0.54

## 2023-12-30 VITALS
BODY MASS INDEX: 31.36 KG/M2 | DIASTOLIC BLOOD PRESSURE: 80 MMHG | OXYGEN SATURATION: 96 % | WEIGHT: 177 LBS | SYSTOLIC BLOOD PRESSURE: 130 MMHG | TEMPERATURE: 98 F | RESPIRATION RATE: 18 BRPM | HEIGHT: 63 IN | HEART RATE: 83 BPM

## 2023-12-30 LAB
ERYTHROCYTE [DISTWIDTH] IN BLOOD BY AUTOMATED COUNT: 42.8 FL (ref 35.9–50)
HCT VFR BLD AUTO: 32.6 % (ref 37–47)
HGB BLD-MCNC: 10.7 G/DL (ref 12–16)
MCH RBC QN AUTO: 27.2 PG (ref 27–33)
MCHC RBC AUTO-ENTMCNC: 32.8 G/DL (ref 32.2–35.5)
MCV RBC AUTO: 82.7 FL (ref 81.4–97.8)
PLATELET # BLD AUTO: 196 K/UL (ref 164–446)
PMV BLD AUTO: 12.9 FL (ref 9–12.9)
RBC # BLD AUTO: 3.94 M/UL (ref 4.2–5.4)
WBC # BLD AUTO: 13 K/UL (ref 4.8–10.8)

## 2023-12-30 PROCEDURE — 36415 COLL VENOUS BLD VENIPUNCTURE: CPT

## 2023-12-30 PROCEDURE — A9270 NON-COVERED ITEM OR SERVICE: HCPCS | Performed by: OBSTETRICS & GYNECOLOGY

## 2023-12-30 PROCEDURE — 700102 HCHG RX REV CODE 250 W/ 637 OVERRIDE(OP): Performed by: OBSTETRICS & GYNECOLOGY

## 2023-12-30 PROCEDURE — 85027 COMPLETE CBC AUTOMATED: CPT

## 2023-12-30 RX ADMIN — PRENATAL WITH FERROUS FUM AND FOLIC ACID 1 TABLET: 3080; 920; 120; 400; 22; 1.84; 3; 20; 10; 1; 12; 200; 27; 25; 2 TABLET ORAL at 09:10

## 2023-12-30 RX ADMIN — IBUPROFEN 800 MG: 800 TABLET, FILM COATED ORAL at 16:23

## 2023-12-30 RX ADMIN — IBUPROFEN 800 MG: 800 TABLET, FILM COATED ORAL at 05:01

## 2023-12-30 RX ADMIN — ACETAMINOPHEN 1000 MG: 500 TABLET ORAL at 12:06

## 2023-12-30 ASSESSMENT — EDINBURGH POSTNATAL DEPRESSION SCALE (EPDS)
I HAVE LOOKED FORWARD WITH ENJOYMENT TO THINGS: AS MUCH AS I EVER DID
I HAVE BEEN ANXIOUS OR WORRIED FOR NO GOOD REASON: YES, SOMETIMES
I HAVE BEEN SO UNHAPPY THAT I HAVE BEEN CRYING: ONLY OCCASIONALLY
THINGS HAVE BEEN GETTING ON TOP OF ME: NO, MOST OF THE TIME I HAVE COPED QUITE WELL
I HAVE FELT SAD OR MISERABLE: NOT VERY OFTEN
I HAVE FELT SCARED OR PANICKY FOR NO GOOD REASON: NO, NOT MUCH
I HAVE BEEN ABLE TO LAUGH AND SEE THE FUNNY SIDE OF THINGS: AS MUCH AS I ALWAYS COULD
I HAVE BEEN SO UNHAPPY THAT I HAVE HAD DIFFICULTY SLEEPING: NOT AT ALL
THE THOUGHT OF HARMING MYSELF HAS OCCURRED TO ME: NEVER
I HAVE BLAMED MYSELF UNNECESSARILY WHEN THINGS WENT WRONG: YES, SOME OF THE TIME

## 2023-12-30 ASSESSMENT — PAIN DESCRIPTION - PAIN TYPE
TYPE: ACUTE PAIN

## 2023-12-30 NOTE — CARE PLAN
The patient is Stable - Low risk of patient condition declining or worsening    Shift Goals  Clinical Goals: Pain control, lochia remain WDL, VSS    Progress made toward(s) clinical / shift goals: Patient pain well controlled with rest, repositioning, and medication as needed. Fundus firm and palpable, lochia light rubra. No s/sx of infection noted during assessment.    Patient is not progressing towards the following goals:

## 2023-12-30 NOTE — CARE PLAN
Problem: Knowledge Deficit - Postpartum  Goal: Patient will verbalize and demonstrate understanding of self and infant care  Outcome: Progressing     Problem: Altered Physiologic Condition  Goal: Patient physiologically stable as evidenced by normal lochia, palpable uterine involution and vitals within normal limits  Outcome: Progressing   The patient is Stable - Low risk of patient condition declining or worsening    Shift Goals: pain controlled, rest  Clinical Goals: maintain normal lochia  Patient Goals: pain controlled, breastfeed, rest  Family Goals: support    Progress made toward(s) clinical / shift goals:  pt verbalized acceptable level of pain    Patient is not progressing towards the following goals:

## 2023-12-30 NOTE — LACTATION NOTE
This note was copied from a baby's chart.  Mom is a 27 y/o P2 who delivered baby boy weighing 5 # 13.8 oz at 38.5 wks. Mom reports that she breast fed her first baby for 6 months then got mastitis and RTW and began offering bottles.Mom then continued to pump up to one year.   Mom states this baby is feeding well and swallows are heard.   LC reviewed demand feeds of 8 or more times in 24 hours, keeping baby STS between feeds and to assist with latching to breast. Reviewed changes to voids and stools over the next several days to yellow and seedy by day 5. LC discussed general nutrition guidelines and avoiding alcohol, THC and illicit drugs when nursing. Mom verbally understands education  Mom has a pump at home for personal use and is not enrolled in WIC.   LC assisted with latching on the right breast in FB hold and demonstrated hand expression with permission. When hand expressing the right breast, LC felt a small marble size, firm nodule on right areola. Mom reports she felt this lump initially on 12/23 when expressing colostrum before delivery. With permission, JESUS attempted to massage this nodule to see if it would soften or break up but it remained the same nd mother expressed some discomfort with palpation, otherwise she does not feel discomfort..  JESUS sent message to Dr Ramirez and she will hold discharge until she can come in to see the patient.   JESUS passed this message on to Estefania VALLE with acknowledgement.  Lactation to follow up as needed

## 2023-12-30 NOTE — PROGRESS NOTES
1900- Report received from Desiree SOLIS RN and Miesha VLALE at the bedside. POC discussed; care assumed. Fundal rub completed with offgoing RN; see flowsheets for details. Pt has no complaints of pain at this time. FOB at the bedside. Pt educated to use call light for questions or concerns at this time. Pt and FOB verbalized understanding.     1955- Pt up to the bathroom with a positive void. Pt educated on peribottle care.     2040- Pt transported up to  via wheelchair with infant swaddled in arms. FOB following with belongings. Report given to Regi VALLE. Infant bands checked against bands of the POB; all correct. POC discussed. No questions or concerns at this time. Care relinquished.

## 2023-12-30 NOTE — H&P
\Obstetrics & Gynecology History & Physical Note    Date of Service  2023    Chief Complaint  Leaking of fluid at 5:15 pm and urge to push    History of Presenting Illness  Baylee Ceballos is a 28 y.o.  at 38w5d 2024, Date entered prior to episode creation.  She is being admitted for labor.    Prenatal care is with Dr Goel and is complicated by:  1. Gestational DM, diet controlled  2. IUGR   3. marginal cord insertion    Patient Active Problem List    Diagnosis Date Noted    Labor and delivery indication for care or intervention 2023    Attention deficit hyperactivity disorder (ADHD), predominantly inattentive type 2023    Nausea and vomiting during pregnancy 2023    Generalized anxiety disorder 2022    Exercise-induced asthma 2022    Allergies 2022    Mood disorder (HCC) 2022    Psoriasis 2022       Obstetric History  OB History    Para Term  AB Living   2 1 1         SAB IAB Ectopic Molar Multiple Live Births                    # Outcome Date GA Lbr Julius/2nd Weight Sex Delivery Anes PTL Lv   2 Current            1 Term 14 40w0d   F           Gynecologic History  N/A    Review of Systems  ROS    Medical History  Past Medical History:   Diagnosis Date    Allergy     seasonal    Asthma     mostly exercise induced/cold temperatures    Depression     Generalized anxiety disorder 2022    Migraine     Psoriasis 2022    Urticaria 2021       Surgical History  Past Surgical History:   Procedure Laterality Date    APPENDECTOMY            Family History  family history includes Psychiatric Illness in her maternal grandmother.     Social History   reports that she has never smoked. She has never used smokeless tobacco. She reports that she does not currently use alcohol. She reports that she does not currently use drugs.    Allergies  No Known Allergies    Medications  Prior to Admission Medications   Prescriptions  Last Dose Informant Patient Reported? Taking?   Blood Glucose Monitoring Suppl (ONETOUCH VERIO FLEX SYSTEM) w/Device Kit   No No   Sig: Use to test fasting sugars and one hour after each meal   Continuous Blood Gluc  (FREESTYLE TONYA 2 READER) Device   No No   Sig: Used to check blood sugars 4 times daily before meals and at bedtime or as needed for signs and symptoms of high or low blood sugar   Continuous Blood Gluc Sensor (FREESTYLE TONYA 2 SENSOR) [facility] [place], [place]   No No   Sig: Apply under skin to check blood sugars 4 times daily before meals and at bedtime.  Change every 14 days.   Doxylamine-Pyridoxine 10-10 MG Tablet Delayed Response delayed-release tablet   No No   Sig: Take 2 Tablets by mouth at bedtime. And One Tablet in the morning.   Lancets (ONETOUCH DELICA PLUS [id number]) Misc   No No   Sig: Use 1 (one) each to test fasting sugars and one hour after each meal   albuterol 108 (90 Base) MCG/ACT Aero Soln inhalation aerosol 10/1/2023  No No   Sig: Inhale 1-2 Puffs every 6 hours as needed for Shortness of Breath.   cetirizine (ZYRTEC) 10 MG Tab   Yes No   Sig: Take 20 mg by mouth every day.   glucose blood (ONETOUCH VERIO) strip   No No   Sig: Use 1 (one) strip to test fasting sugars and one hour after each meal   injection RSV Pre-Fusion F A&B Vac Rcmb (ABRYSVO) 120 MCG/0.5ML Recon Soln   No No   Sig: Inject  into the shoulder, thigh, or buttocks.   ketoconazole (NIZORAL) 2 % Cream   No No   Sig: Apply once daily to cover the affected and immediate surrounding area for 2 weeks.   ondansetron (ZOFRAN ODT) 4 MG TABLET DISPERSIBLE   No No   Sig: Take 1 Tablet by mouth every 6 hours as needed for Nausea/Vomiting.      Facility-Administered Medications: None       Physical Exam  Vitals:    12/29/23 1818 12/29/23 1841   Pulse: 86 86   Resp:  18   Temp: 36.6 °C (97.8 °F) 36.4 °C (97.6 °F)   TempSrc: Temporal Temporal   SpO2: 99% 100%       General:   alert, cooperative                           Abdomen:  Abdomen  soft, non-tender; gravid.   Pelvis: adequate   FHT:  140's, limited FHR before delivery   Stagecoach: External US   Presentations: Vertex by exam   Cervix:     Dilation: Lip/Rim     Effacement: 100    Station:  0    Consistency:      Position:         Laboratory:  Prenatal Results       General (Most Recent Result)       Test Value Reference Range Date Time    ABO  A   06/26/23 1603    Rh  POS   06/26/23 1603    Antibody screen  NEG   06/26/23 1603    HbA1c  5.0 % 4.0 - 5.6 03/04/22 1300    Chlamydia by PCR  Negative  Negative 06/26/23 1547    Gonorrhea by PCR  Negative  Negative 06/26/23 1547    RPR/Syphilus  Non-Reactive  Non-Reactive 09/19/23 1349    HSV 1/2 by PCR (non-serum)        HSV 1/2 (serum)        HSV 1        HSV 2        HPV (16)        HBsAg  Non-Reactive  Non-Reactive 06/26/23 1603    HIV-1 HIV-2 Antibodies  Non-Reactive  Non Reactive 06/26/23 1603    Rubella  94.20 IU/mL  06/26/23 1603    Tb                  Pap Smear (Most Recent Result)       Test Value Reference Range Date Time    Pap smear        Pap smear w/HPV        Pap smear w/CTNG        Pap smar w/HPV CTNG        Pap smear (reflex HPV ACUS)        Pap smear (reflex HPV ASCUS w/CTNG)        Pathology gyn specimen  (See Report)    06/26/23 1547              Urinalysis (Most Recent Result)       Test Value Reference Range Date Time    Urinalysis        POC urinalysis        Urine drug screen (w/ conf)        Urine culture ([id number])  Abnormal   (See Report)    06/26/23 1603    Urine Protein/Creatinine Ratio                  Urinalysis, Culture if indicated       Test Value Reference Range Date Time    Color        Appearance        Specific Gravity        PH        Glucose        Ketones        Protein        Bilirubin        Nitrites        Leukocytes Esterase        Blood        Comment        Culture                  Urine Drug Screen       Test Value Reference Range Date Time    Amphetamines        Barbiturates        Benzodiazepines         Cocaine        Methadone        Opiates        Oxycodone        Phencylidine        Propoxyphene        Marijuana Metabolite                  1st Trimester       Test Value Reference Range Date Time    Hgb  13.4 g/dL 12.0 - 16.0 23 1603    Hct  38.7 % 37.0 - 47.0 23 1603    Fasting Glucose Tolerance        GTT, 1 hour        GTT, 2 hours        GTT, 3 hours                  2nd Trimester       Test Value Reference Range Date Time    Hgb  12.0 g/dL 12.0 - 16.0 23 1349    Hct  36.9 % 37.0 - 47.0 23 1349    AST        ALT        Uric Acid        Fasting Glucose Tolerance        GTT, 1 hour  153 mg/dL 70 - 139 23 1349    GTT, 2 hours  188 mg/dL 65 - 155 23 0917    GTT, 3 hours  155 mg/dL 65 - 140 23 0917              3rd Trimester       Test Value Reference Range Date Time    Hgb        Hct        Platelet count        GBS (BOCANEGRA BROTH)  Negative  Negative 23 1003    Fasting Glucose Tolerance        GTT, 1 hour        GTT, 2 hous        GTT, 3hours                  Congenital Disease Screening       Test Value Reference Range Date Time    First Trimester Screen        Quad Screen        BH Electrophoresis        Cystic Fibrosis Carrier Study        SMA        AFP Maternal Serum   (See Report)    23 1413    AFP Tetra        NIPT                  Legend    ^: Historical                                Assessment:  28 y.o.  38w5d who presents with  Labor and delivery indication for care or intervention [O75.9]    Plan:  Pt arrived 9+/c/+2 and after 4 min in L & D had a precipitous vaginal delivery      Judith Ramirez M.D.

## 2023-12-30 NOTE — CARE PLAN
The patient is Watcher - Medium risk of patient condition declining or worsening    Shift Goals  Clinical Goals: Monitor Bleeding; Stable VS  Patient Goals: Healthy Mom; Healthy Baby  Family Goals: Comfort and Support    Progress made toward(s) clinical / shift goals:      Problem: Psychosocial - L&D  Goal: Patient's level of anxiety will decrease  Outcome: Progressing     Problem: Risk for Fluid Imbalance  Goal: Patient's fluid volume balance will be maintained or improve  Outcome: Progressing     Problem: Risk for Injury  Goal: Patient and fetus will be free of preventable injury/complications  Outcome: Progressing     Problem: Pain  Goal: Patient's pain will be alleviated or reduced to the patient’s comfort goal  Outcome: Progressing       Patient is not progressing towards the following goals:

## 2023-12-30 NOTE — L&D DELIVERY NOTE
DATE OF SERVICE:  2023     ADMITTING DIAGNOSES:  1.  Intrauterine pregnancy at 38 weeks and 5 days.  2.  Spontaneous rupture of membranes.  3.  Active labor, arriving anterior lip and ready to push.  4.  GBS negative.  5.  Suspected IUGR.  6.  A1 diabetes.  7.  Marginal cord insertion.  8.  Group B strep negative.     PROCEDURES:  1.  Admission to labor and delivery.  2.  Precipitous normal spontaneous vaginal delivery of a vigorous male with   Apgars 8 and 9.     DELIVERY:  This patient is a 28-year-old  3, para 1 with a due date of   2024, admitted at 38 weeks and 5 days.  The patient arrived to labor and   delivery at about 06:14 p.m. and ready to push.  She had spontaneous rupture   of membranes at home at 05:15 and it was clear. When she arrived, she was   anterior lip, completely effaced and 0 station with a vertex presentation.    She precipitously changed to complete at 06:22 p.m.  At that time, she was   prepped and draped in the usual sterile fashion.  Fetal heart tones were in   the 140s, but only had about a 5-minute fetal heart tracing.  She was prepped   and draped in the usual sterile fashion and at 06:23, I assisted with a normal   spontaneous vaginal delivery of a vigorous male in SKIP position with Apgars 8   and 9.  The head was delivered atraumatically and the rest of the infant   delivered without any problems.  Mouth and nose were bulb suctioned.  A loose   nuchal cord and a body cord were reduced and then infant was placed on   maternal abdomen.  Delayed cord clamp was performed for 2 minutes and then the   cord was doubly clamped and cut by the infant's father.  The placenta was   then delivered intact at 06:33 p.m.  Three-vessel cord was noted.  Marginal   cord insertion verified.  Uterus is firm and hemostatic.  No lacerations.    Total EBL was 300.  The patient did not have an IV and therefore IM Pitocin   was administered.  Baby and mom are doing well.  She was GBS  negative.  Again,   this was a vigorous male with Apgars 8 and 9.        ______________________________  YOLY GREENWOOD MD    [facility] [place]    DD:  12/29/2023 19:07  DT:  12/29/2023 19:27    Job#:  [id number]    CC:HELLEN ROUSE MD

## 2023-12-30 NOTE — PROGRESS NOTES
0700- Received report from LEONARD Deluna. Assumed care. 12 hour chart check, MAR and orders reviewed.      0900- Assessment complete. Fundus firm and palpable, lochia scant rubra. Pain management and interventions discussed with pt. SO at bedside. POC discussed. All questions and concerns discussed. No further concerns.

## 2023-12-30 NOTE — DISCHARGE SUMMARY
Discharge Summary:      Baylee Ceballos    Admit Date:   2023  Discharge Date:  2023     Admitting diagnosis:  Labor and delivery indication for care or intervention [O75.9]  Discharge Diagnosis: Status post vaginal, spontaneous.  Pregnancy Complications:    1.  Intrauterine pregnancy at 38 weeks and 5 days.  2.  Spontaneous rupture of membranes.  3.  Active labor, arriving anterior lip and ready to push.  4.  GBS negative.  5.  Suspected IUGR.  6.  A1 diabetes.  7.  Marginal cord insertion.  8.  Group B strep negative.    History:  Past Medical History:   Diagnosis Date    Allergy     seasonal    Asthma     mostly exercise induced/cold temperatures    Depression     Generalized anxiety disorder 2022    Migraine     Psoriasis 2022    Urticaria 2021     OB History    Para Term  AB Living   2 2 2     1   SAB IAB Ectopic Molar Multiple Live Births           0 1      # Outcome Date GA Lbr Julius/2nd Weight Sex Delivery Anes PTL Lv   2 Term 23 38w5d / 00:07 2.66 kg (5 lb 13.8 oz) M Vag-Spont None N JERRELL   1 Term 14 40w0d   F            Patient has no known allergies.  Patient Active Problem List    Diagnosis Date Noted    Labor and delivery indication for care or intervention 2023    Attention deficit hyperactivity disorder (ADHD), predominantly inattentive type 2023    Nausea and vomiting during pregnancy 2023    Generalized anxiety disorder 2022    Exercise-induced asthma 2022    Allergies 2022    Mood disorder (HCC) 2022    Psoriasis 2022        Hospital Course:   28 y.o. , now para 2, was admitted with the above mentioned diagnosis, underwent Active Labor, vaginal, spontaneous. Patient postpartum course was unremarkable, with progressive advancement in diet , ambulation and toleration of oral analgesia. Patient without complaints today and desires discharge.      Vitals:    23  12/30/23 0137 12/30/23 0605   BP: 126/79 128/81 112/75 117/84   Pulse: 65 87 64 73   Resp:  17 17 16   Temp:  36.6 °C (97.9 °F) 36.5 °C (97.7 °F) 36.7 °C (98.1 °F)   TempSrc:  Temporal Temporal Temporal   SpO2:  96% 97% 96%   Weight:       Height:           Current Facility-Administered Medications   Medication Dose    lactated ringers infusion      docusate sodium (Colace) capsule 100 mg  100 mg    ibuprofen (Motrin) tablet 800 mg  800 mg    acetaminophen (Tylenol) tablet 1,000 mg  1,000 mg    PRN oxytocin (PITOCIN) (20 Units/1000 mL) PRN for excessive uterine bleeding - See Admin Instr  125-999 mL/hr    miSOPROStol (Cytotec) tablet 600 mcg  600 mcg    methylergonovine (Methergine) injection 0.2 mg  0.2 mg    carboPROST (Hemabate) injection 250 mcg  250 mcg    prenatal plus vitamin (Stuartnatal 1+1) 27-1 MG tablet 1 Tablet  1 Tablet    LR infusion      oxytocin (Pitocin) infusion bolus (for post delivery)  20 Units    Followed by    oxytocin (Pitocin) infusion (for post delivery)  125 mL/hr       Exam:  Breast Exam: negative  Abdomen: Abdomen soft, non-tender. BS normal. No masses,  No organomegaly  Fundus Non Tender: yes  Incision: none  Extremity: extremities, peripheral pulses and reflexes normal     Labs:  Recent Labs     12/29/23  1924 12/30/23  0604   WBC 12.0* 13.0*   RBC 4.14* 3.94*   HEMOGLOBIN 11.3* 10.7*   HEMATOCRIT 34.3* 32.6*   MCV 82.9 82.7   MCH 27.3 27.2   MCHC 32.9 32.8   RDW 43.2 42.8   PLATELETCT 192 196   MPV 12.7 12.9        Activity:   Discharge to home  Pelvic Rest x 6 weeks    Assessment:  normal postpartum course  Discharge Assessment: No heavy bleeding or foul vaginal discharge      Follow up: 6 weeks with Dr Goel     Discharge Meds:   No current outpatient medications on file.   OTC ibuprofen/tylenol  Ferrous sulfate 325 mg one tab bid x 8 weeks    Judith Ramirez M.D.

## 2023-12-30 NOTE — PROGRESS NOTES
1814 29 yo  at 38.5 here to L&D with c/o of rupture at 1715 to clear and contractions every 2 minutes. Transferred to labor room, EFM/TOCO applied, SVE 9.5 and 0 station and feeling urge to push. Dr. Ramirez called to bedside. GDM diet controlled this pregnancy and history of asthma. THC use this year, but quit after finding out about pregnancy. FHR in the 150's, moderate, contractions every 1-2    1823  delivery of male infant, APGARS 8/9

## 2023-12-30 NOTE — PROGRESS NOTES
2040 Admitted from L and D via wheelchair, pt oriented to the room, Safety precaution and plan of care discussed, Assessment done fundus firm with light lochia, abdomen soft non distended, Denies pain at this time,Admission care rendered.

## 2023-12-30 NOTE — DISCHARGE PLANNING
Discharge Planning Assessment Post Partum    Reason for Referral: Hx of THC  Address: [address]  Type of Living Situation:Stable   Mom Diagnosis: Postpartum  Baby Diagnosis: Cardale  Primary Language: English     Name of Baby: Cresencio Ceballos  Father of the Baby: Jagdeep Ceballos  Involved in baby’s care? Yes  Contact Information: [phone]    Prenatal Care: Yes  Mom's PCP: Steph   PCP for new baby:Danika    Support System: Yes  Coping/Bonding between mother & baby: MOB coping/bonding   Source of Feeding: Breast  Supplies for Infant: Yes    Mom's Insurance: HTH  Baby Covered on Insurance: Yes  Mother Employed/School: None  Other children in the home/names & ages: 9 yr old     Financial Hardship/Income: None   Mom's Mental status: Stable and alert   Services used prior to admit: None    CPS History: Report made for THC  Psychiatric History: None  Domestic Violence History: None  Drug/ETOH History: Hx of THC.    Resources Provided:  provided support and declined resources at this time  Referrals Made: None     Clearance for Discharge: Baby is cleared to discharge with MOB and FOB when medically cleared

## 2023-12-30 NOTE — L&D DELIVERY NOTE
Delivery note  Pt arrived at 6:14 pm and was AL/C/+2 with urge to push. Pt had SROM at home at 5:15 pm, clear. She was complete at 6:22 pm and prepped and draped in usual fashion. At 6:23 pm, I assisted with precipitous vaginal delivery of a vigorous male in SKIP position with apgars of 8 and 9. Loose nuchal cord and body cord reduced. Infant placed on maternal abdomen and delayed cord x 2 min. Cord doubly clamped and cut by infant's father. Placenta delivered at 6:33 pm. No lacerations, ebl: 300. IM pitocin administed, 10 mu. Baby and mom are doing well.    Pt was GBBS negative.

## 2023-12-30 NOTE — DISCHARGE INSTRUCTIONS

## 2023-12-31 NOTE — PROGRESS NOTES
Discharge paperwork for infant and mom discussed at bedside. All questions answered. Follow-up appointments reviewed. Parents aware of NBS #2 and dates to complete it by. Paperwork signed and dated at this time.

## 2023-12-31 NOTE — PROGRESS NOTES
Post Partum Progress Note    Name:   Baylee Ceballos   Date/Time:  12/30/2023 - 4:49 PM  Chief Admitting Dx:  Labor and delivery indication for care or intervention [O75.9]    Came to evaluate pt after the lactation specialist felt a mass on pt's right breast while assisting her with nursing.    I spoke with pt and examined pt. Pt without a family h/o of breast cancer. Pt did notice the non-painful mass on 12/23/2023 when she was trying to express colostrum at home. Pt denies pain or trauma to the breast.     Vitals:    12/29/23 2015 12/29/23 2049 12/30/23 0137 12/30/23 0605   BP: 126/79 128/81 112/75 117/84   Pulse: 65 87 64 73   Resp:  17 17 16   Temp:  36.6 °C (97.9 °F) 36.5 °C (97.7 °F) 36.7 °C (98.1 °F)   TempSrc:  Temporal Temporal Temporal   SpO2:  96% 97% 96%   Weight:       Height:           Exam:  Gen:NAD  Breast: baby nursing on left breast  Right breast exam, large breast without erythema, a mobile, grape size mass palpated at 9 o'clock about 1 cm to the left of the areola, minimal tenderness to palpation. No other masses and no lymphadenopathy palpated    Meds:  Current Facility-Administered Medications   Medication Dose    lactated ringers infusion      docusate sodium (Colace) capsule 100 mg  100 mg    ibuprofen (Motrin) tablet 800 mg  800 mg    acetaminophen (Tylenol) tablet 1,000 mg  1,000 mg    PRN oxytocin (PITOCIN) (20 Units/1000 mL) PRN for excessive uterine bleeding - See Admin Instr  125-999 mL/hr    miSOPROStol (Cytotec) tablet 600 mcg  600 mcg    methylergonovine (Methergine) injection 0.2 mg  0.2 mg    carboPROST (Hemabate) injection 250 mcg  250 mcg    prenatal plus vitamin (Stuartnatal 1+1) 27-1 MG tablet 1 Tablet  1 Tablet    LR infusion      oxytocin (Pitocin) infusion bolus (for post delivery)  20 Units    Followed by    oxytocin (Pitocin) infusion (for post delivery)  125 mL/hr       Labs:   Recent Labs     12/29/23  1924 12/30/23  0604   WBC 12.0* 13.0*   RBC 4.14* 3.94*    HEMOGLOBIN 11.3* 10.7*   HEMATOCRIT 34.3* 32.6*   MCV 82.9 82.7   MCH 27.3 27.2   MCHC 32.9 32.8   RDW 43.2 42.8   PLATELETCT 192 196   MPV 12.7 12.9       Assessment/Plan:  1-Right breast mass-likely benign. Pt noticed it on 12/23 and it has remained the same size. Pt to f/u with Dr Goel in 6 weeks and if it persist they can decided if pt needs a mammogram and or ultrasound of the right breast. Pt to f/u with Dr Goel sooner than 6 weeks if the grape size mass becomes painful or larger.    Judith Ramirez M.D.

## 2023-12-31 NOTE — PROGRESS NOTES
Patient aware that discharge will be on hold until Dr. Ramirez able to come up to see her regarding LC concern. Patient and SO verbalizes understanding at this time.

## 2024-01-09 ENCOUNTER — PHARMACY VISIT (OUTPATIENT)
Dept: PHARMACY | Facility: MEDICAL CENTER | Age: 29
End: 2024-01-09
Payer: COMMERCIAL

## 2024-01-09 PROCEDURE — RXMED WILLOW AMBULATORY MEDICATION CHARGE: Performed by: INTERNAL MEDICINE

## 2024-01-09 RX ORDER — COVID-19 VACCINE, MRNA 0.04 MG/.418ML
INJECTION, SUSPENSION INTRAMUSCULAR
Qty: 0.3 ML | Refills: 0 | OUTPATIENT
Start: 2024-01-09

## 2024-01-15 RX ORDER — ONDANSETRON 8 MG/1
TABLET, ORALLY DISINTEGRATING ORAL
COMMUNITY

## 2024-01-16 ENCOUNTER — OFFICE VISIT (OUTPATIENT)
Dept: MEDICAL GROUP | Facility: MEDICAL CENTER | Age: 29
End: 2024-01-16
Payer: COMMERCIAL

## 2024-01-16 VITALS
DIASTOLIC BLOOD PRESSURE: 68 MMHG | RESPIRATION RATE: 16 BRPM | WEIGHT: 155 LBS | HEART RATE: 68 BPM | SYSTOLIC BLOOD PRESSURE: 120 MMHG | BODY MASS INDEX: 27.46 KG/M2 | TEMPERATURE: 98.1 F | HEIGHT: 63 IN | OXYGEN SATURATION: 96 %

## 2024-01-16 DIAGNOSIS — N81.89 PELVIC FLOOR WEAKNESS: ICD-10-CM

## 2024-01-16 DIAGNOSIS — N63.10 MASS OF RIGHT BREAST, UNSPECIFIED QUADRANT: ICD-10-CM

## 2024-01-16 DIAGNOSIS — F90.0 ATTENTION DEFICIT HYPERACTIVITY DISORDER (ADHD), PREDOMINANTLY INATTENTIVE TYPE: ICD-10-CM

## 2024-01-16 DIAGNOSIS — Z23 NEED FOR VACCINATION: ICD-10-CM

## 2024-01-16 DIAGNOSIS — F39 MOOD DISORDER (HCC): ICD-10-CM

## 2024-01-16 PROCEDURE — 3074F SYST BP LT 130 MM HG: CPT | Performed by: NURSE PRACTITIONER

## 2024-01-16 PROCEDURE — 90677 PCV20 VACCINE IM: CPT | Performed by: NURSE PRACTITIONER

## 2024-01-16 PROCEDURE — 90471 IMMUNIZATION ADMIN: CPT | Performed by: NURSE PRACTITIONER

## 2024-01-16 PROCEDURE — 3078F DIAST BP <80 MM HG: CPT | Performed by: NURSE PRACTITIONER

## 2024-01-16 PROCEDURE — 99214 OFFICE O/P EST MOD 30 MIN: CPT | Mod: 25 | Performed by: NURSE PRACTITIONER

## 2024-01-16 ASSESSMENT — PATIENT HEALTH QUESTIONNAIRE - PHQ9
SUM OF ALL RESPONSES TO PHQ QUESTIONS 1-9: 5
5. POOR APPETITE OR OVEREATING: 0 - NOT AT ALL
CLINICAL INTERPRETATION OF PHQ2 SCORE: 2

## 2024-01-16 ASSESSMENT — FIBROSIS 4 INDEX: FIB4 SCORE: 0.65

## 2024-01-16 NOTE — PROGRESS NOTES
Subjective:     HPI:     Baylee Ceballos is a 28 y.o. female  presents to discuss:     Presents today 2 to 3 weeks postpartum of a healthy baby boy 12-29-23.  States that she is feeling physically pretty good.  Vaginal bleeding is stable.  She is maintaining adequate hydration and nutrition.   is very supportive at home at this time.  History of postpartum depression on her first child.  She has been completing the [place] postpartum depression scale at follow-up appointments with the pediatrician.  She would like to hold off on any medications at this time.  She remains in counseling.  She would like to at least wait about 6 months prior to starting on any medication.  She did notice a lump on her right breast back in December prior to delivery.  It has not changed in size.  It initially felt more superficia.  She describes it as a size of a marble.  She is currently breast-feeding.  Denies any redness, fevers or disruption to her milk supply at this time.  No family history of breast cancer.  No previous imaging on file.  Slight tender with more pumping.  She has her follow-up appointment with her OB/GYN in the next 1 to 2 weeks.    Historically did not tolerate the side effects of Zoloft.  Wellbutrin made her more anxious.  Currently in counseling which has been very helpful-.    We briefly discussed starting on treatment for ADHD in the future.  Treatment was held due to pregnancy.    ROS: : see above      Current Outpatient Medications:     ondansetron (ZOFRAN ODT) 8 MG TABLET DISPERSIBLE, , Disp: , Rfl:     COVID-19 mRNA Vac-Zoltan,Pfizer, (COMIRNATY) 30 MCG/0.3ML Suspension Prefilled Syringe injection, Inject  into the shoulder, thigh, or buttocks., Disp: 0.3 mL, Rfl: 0    injection RSV Pre-Fusion F A&B Vac Rcmb (ABRYSVO) 120 MCG/0.5ML Recon Soln, Inject  into the shoulder, thigh, or buttocks., Disp: 0.5 mL, Rfl: 0    albuterol 108 (90 Base) MCG/ACT Aero Soln inhalation aerosol, Inhale 1-2 Puffs  "every 6 hours as needed for Shortness of Breath., Disp: 8.5 g, Rfl: 11    cetirizine (ZYRTEC) 10 MG Tab, Take 20 mg by mouth every day., Disp: , Rfl:     No Known Allergies    Objective:     Vitals: /68   Pulse 68   Temp 36.7 °C (98.1 °F)   Resp 16   Ht 1.6 m (5' 3\")   Wt 70.3 kg (155 lb)   SpO2 96%   Breastfeeding Yes   BMI 27.46 kg/m²    General: Alert, pleasant, NAD  HEENT: Normocephalic.  Neck supple.   Respiratory: no distress, no audible wheezing, RR -WNL  Skin: Warm, dry, no rashes.  Extremities: No leg edema. No discoloration  Neurological: No tremors  Psych:  Affect/mood is normal, judgement is good, memory is intact, grooming is appropriate.    Assessment/Plan:      1. Post partum depression  Clinically present.  Positive depression screening on [place] scale.  Scanned in chart.  Patient is stable at this time would like to hold off on any medications at this time.  She has great support at home and will continue follow-up with counseling.  She will continue to complete [place] scale at home for self monitoring.  At this time patient would like to hold on starting any medications for at least up to 6 months.  Feel that this is reasonable at this time.      2. Mass of right breast, unspecified quadrant  New problem uncertain diagnosis at this time.  We have discussed possible differentials.  I do recommend further imaging with ultrasound and mammogram.  - US-BREAST LIMITED-RIGHT; Future  - MA DIAGNOSTIC MAMMO RIGHT W/CAD; Future    3. Pelvic floor weakness  Status post vaginal delivery.  Recommend pelvic floor therapy consultation.  - Referral to Physical Therapy    4. Mood disorder (HCC)  Chronic.  She is stable at this time.  She is very much aware of monitoring her feelings.  She has great support at home with her .  She will continue with counseling.  We have discussed starting on medications in the future.  She would like to at least wait about 6 months if possible she would " like to continue breast-feeding.    -Historically she has not tolerated sertraline and Wellbutrin due to side effects.    5. Attention deficit hyperactivity disorder (ADHD), predominantly inattentive type  Known problem currently not treated with pharmaceuticals.  She is stable at this time although is ready to start on the medication.  We have discussed holding at this time given she is breast-feeding.    6. Need for vaccination  - Pneumococcal Conjugate Vaccine 20-Valent (6 wks+)      Return for as needed, 3-6 months.    {I have placed the above orders and discussed them with an approved delegating provider. The MA is performing the below orders under the direction of Dr. Constantin RANDHAWA

## 2024-01-23 ENCOUNTER — HOSPITAL ENCOUNTER (OUTPATIENT)
Dept: RADIOLOGY | Facility: MEDICAL CENTER | Age: 29
End: 2024-01-23
Attending: NURSE PRACTITIONER
Payer: COMMERCIAL

## 2024-01-23 DIAGNOSIS — N63.10 MASS OF RIGHT BREAST, UNSPECIFIED QUADRANT: ICD-10-CM

## 2024-01-23 PROCEDURE — 76642 ULTRASOUND BREAST LIMITED: CPT | Mod: RT

## 2024-02-06 ENCOUNTER — OFFICE VISIT (OUTPATIENT)
Dept: BEHAVIORAL HEALTH | Facility: CLINIC | Age: 29
End: 2024-02-06
Payer: COMMERCIAL

## 2024-02-06 DIAGNOSIS — F90.0 ATTENTION DEFICIT HYPERACTIVITY DISORDER (ADHD), PREDOMINANTLY INATTENTIVE TYPE: ICD-10-CM

## 2024-02-06 DIAGNOSIS — F41.1 GENERALIZED ANXIETY DISORDER: ICD-10-CM

## 2024-02-06 PROCEDURE — 90837 PSYTX W PT 60 MINUTES: CPT | Performed by: MARRIAGE & FAMILY THERAPIST

## 2024-02-06 NOTE — PROGRESS NOTES
[facility]   Therapy Progress Note        Name: Baylee Ceballos  MRN: [id number]  : 1995  Age: 28 y.o.  Date of assessment: 2024  PCP: SYDNEE Patrick  Persons in attendance: Patient  Total session time: 55 minutes      Topics addressed in psychotherapy include: Met with the patient in office for an individual counseling session.      Content of Therapy:   The patient discussed the recent birth of their son on .  The patient discussed multiple supports at her disposal.  Patient discussed having increased anxiety about “dropping one thing and expecting everything else to fall”.    Therapeutic Intervention:   This session, the therapeutic focus, was on challenging the patients belief systems regarding to catastrophizing events.  Discussed with the patient her supports.  Worked with the patient on understanding the need not to feel perfect.  This dictation has been created using voice recognition software and/or scribes. The accuracy of the dictation is limited by the abilities of the software and the expertise of the scribes. I expect there may be some errors of grammar and possibly content. I made every attempt to manually correct the errors within my dictation. However, errors related to voice recognition software and/or scribes may still exist and should be interpreted within the appropriate context.    Objective Observations:   Participation:Active verbal participation, Attentive, and Engaged   Grooming:Casual   Cognition:Alert and Fully Oriented   Eye Contact:Good   Mood:Euthymic   Affect:Flexible, Full range, and Congruent with content   Thought Process:Logical and Goal-directed   Speech:Rate within normal limits and Volume within normal limits    Current Risk:   Suicide: low   Homicide: low   Self-Harm: low   Relapse: low   Safety Plan Reviewed: not applicable    Care Plan Updated: No    Does patient express agreement with the above plan? Yes      Diagnosis:  1. Generalized anxiety disorder    2. Attention deficit hyperactivity disorder (ADHD), predominantly inattentive type        Referral appointment(s) scheduled? VAISHALI Choi.

## 2024-02-09 PROBLEM — F39 MOOD DISORDER (HCC): Status: RESOLVED | Noted: 2022-01-17 | Resolved: 2024-02-09

## 2024-02-14 ENCOUNTER — HOSPITAL ENCOUNTER (OUTPATIENT)
Dept: LAB | Facility: MEDICAL CENTER | Age: 29
End: 2024-02-14
Attending: OBSTETRICS & GYNECOLOGY
Payer: COMMERCIAL

## 2024-02-14 PROCEDURE — 87591 N.GONORRHOEAE DNA AMP PROB: CPT

## 2024-02-14 PROCEDURE — 87491 CHLMYD TRACH DNA AMP PROBE: CPT

## 2024-02-15 LAB
C TRACH DNA GENITAL QL NAA+PROBE: NEGATIVE
N GONORRHOEA DNA GENITAL QL NAA+PROBE: NEGATIVE
SPECIMEN SOURCE: NORMAL

## 2024-02-23 ENCOUNTER — OFFICE VISIT (OUTPATIENT)
Dept: BEHAVIORAL HEALTH | Facility: CLINIC | Age: 29
End: 2024-02-23
Payer: COMMERCIAL

## 2024-02-23 DIAGNOSIS — F90.0 ATTENTION DEFICIT HYPERACTIVITY DISORDER (ADHD), PREDOMINANTLY INATTENTIVE TYPE: ICD-10-CM

## 2024-02-23 DIAGNOSIS — F41.1 GENERALIZED ANXIETY DISORDER: ICD-10-CM

## 2024-02-23 PROCEDURE — 90837 PSYTX W PT 60 MINUTES: CPT | Performed by: MARRIAGE & FAMILY THERAPIST

## 2024-02-23 NOTE — PROGRESS NOTES
[facility]   Therapy Progress Note        Name: Baylee Ceballos  MRN: [id number]  : 1995  Age: 28 y.o.  Date of assessment: 2024  PCP: SYDNEE Patrick  Persons in attendance: Patient  Total session time: 56 minutes      Topics addressed in psychotherapy include: Met with the patient in office for an individual counseling session.      Content of Therapy:   The patient discussed that she feels she has increasing anxiety regarding the health and well being of her  son.  Patient gave examples of losing sleep as she worries if he is breathing.  Patient discussed concerns regarding possible the matter how large ease despite no Contra indicators.      Therapeutic Intervention:   This session, the therapeutic focus, was on validating the patient's concerns and normalizing increased anxiety with a  baby.  Explored with patient like differences in education and setting since she had her daughter nine years ago.  Worked through cognitive behavioral therapy to reduce patient's anxiety.  This dictation has been created using voice recognition software and/or scribes. The accuracy of the dictation is limited by the abilities of the software and the expertise of the scribes. I expect there may be some errors of grammar and possibly content. I made every attempt to manually correct the errors within my dictation. However, errors related to voice recognition software and/or scribes may still exist and should be interpreted within the appropriate context.    Objective Observations:   Participation:Active verbal participation, Attentive, and Engaged   Grooming:Casual   Cognition:Alert and Fully Oriented   Eye Contact:Good   Mood:Euthymic and Anxious   Affect:Flexible and Full range   Thought Process:Logical and Goal-directed   Speech:Rate within normal limits and Volume within normal limits    Current Risk:   Suicide: low   Homicide: low   Self-Harm: low   Relapse:  low   Safety Plan Reviewed: not applicable    Care Plan Updated: No    Does patient express agreement with the above plan? Yes     Diagnosis:  1. Generalized anxiety disorder    2. Attention deficit hyperactivity disorder (ADHD), predominantly inattentive type        Referral appointment(s) scheduled? VAISHALI Choi.

## 2024-03-05 ENCOUNTER — OFFICE VISIT (OUTPATIENT)
Dept: BEHAVIORAL HEALTH | Facility: CLINIC | Age: 29
End: 2024-03-05
Payer: COMMERCIAL

## 2024-03-05 DIAGNOSIS — F90.0 ATTENTION DEFICIT HYPERACTIVITY DISORDER (ADHD), PREDOMINANTLY INATTENTIVE TYPE: ICD-10-CM

## 2024-03-05 DIAGNOSIS — F41.1 GENERALIZED ANXIETY DISORDER: ICD-10-CM

## 2024-03-05 PROCEDURE — 90837 PSYTX W PT 60 MINUTES: CPT | Performed by: MARRIAGE & FAMILY THERAPIST

## 2024-03-05 NOTE — PROGRESS NOTES
[facility]   Therapy Progress Note        Name: Baylee Ceballos  MRN: [id number]  : 1995  Age: 28 y.o.  Date of assessment: 3/5/2024  PCP: SYDNEE Patrick  Persons in attendance: Patient  Total session time: 56 minutes      Topics addressed in psychotherapy include: Met with the patient in office for an individual counseling session.      Content of Therapy:   The patient discussed stressors of parenthood at this point in her life.  Patient discussed the additional support she is receiving with the birth of their son.  Patient discussed limits and boundaries which have been set with a and through her daughters father.      Therapeutic Intervention:   This session, the therapeutic focus, was on assisting the patient in identifying this individual time in her life.  Therapist discuss the importance of self advocacy and self empowerment.  Discussed resilience and bouncing back and setbacks  This dictation has been created using voice recognition software and/or scribes. The accuracy of the dictation is limited by the abilities of the software and the expertise of the scribes. I expect there may be some errors of grammar and possibly content. I made every attempt to manually correct the errors within my dictation. However, errors related to voice recognition software and/or scribes may still exist and should be interpreted within the appropriate context.    Objective Observations:   Participation:Active verbal participation, Attentive, and Engaged   Grooming:Casual   Cognition:Alert and Fully Oriented   Eye Contact:Good   Mood:Euthymic   Affect:Flexible and Full range   Thought Process:Logical and Goal-directed   Speech:Rate within normal limits and Volume within normal limits    Current Risk:   Suicide: low   Homicide: low   Self-Harm: low   Relapse: low   Safety Plan Reviewed: not applicable    Care Plan Updated: No    Does patient express agreement with the above plan? Yes      Diagnosis:  1. Generalized anxiety disorder    2. Attention deficit hyperactivity disorder (ADHD), predominantly inattentive type        Referral appointment(s) scheduled? VAISHALI Choi.

## 2024-03-19 ENCOUNTER — OFFICE VISIT (OUTPATIENT)
Dept: BEHAVIORAL HEALTH | Facility: CLINIC | Age: 29
End: 2024-03-19
Payer: COMMERCIAL

## 2024-03-19 DIAGNOSIS — F41.1 GENERALIZED ANXIETY DISORDER: ICD-10-CM

## 2024-03-19 DIAGNOSIS — F90.0 ATTENTION DEFICIT HYPERACTIVITY DISORDER (ADHD), PREDOMINANTLY INATTENTIVE TYPE: ICD-10-CM

## 2024-03-19 PROCEDURE — 90837 PSYTX W PT 60 MINUTES: CPT | Performed by: MARRIAGE & FAMILY THERAPIST

## 2024-03-25 NOTE — PROGRESS NOTES
[facility]   Therapy Progress Note        Name: Baylee Ceballos  MRN: [id number]  : 1995  Age: 28 y.o.  Date of assessment: 3/19/2024  PCP: SYDNEE Patrick  Persons in attendance: Patient  Total session time: 57 minutes      Topics addressed in psychotherapy include: Met with the patient in office for an individual counseling session.      Content of Therapy:   The patient discussed that she was recently able to go out for pizza with your friends and .  Patient discussed continuing to carry baby weight and starting to feel son need for baby independence.  Discussed parenting.      Therapeutic Intervention:   This session, the therapeutic focus was on setting new goals of appropriate self care.  Discussed with the patient the need to take a reasonable amount of time in allowing the process of change.    This dictation has been created using voice recognition software and/or scribes. The accuracy of the dictation is limited by the abilities of the software and the expertise of the scribes. I expect there may be some errors of grammar and possibly content. I made every attempt to manually correct the errors within my dictation. However, errors related to voice recognition software and/or scribes may still exist and should be interpreted within the appropriate context.    Objective Observations:   Participation:Active verbal participation, Attentive, and Engaged   Grooming:Casual   Cognition:Alert and Fully Oriented   Eye Contact:Good   Mood:Euthymic   Affect:Flexible, Full range, and Congruent with content   Thought Process:Logical and Goal-directed   Speech:Rate within normal limits and Volume within normal limits    Current Risk:   Suicide: low   Homicide: low   Self-Harm: low   Relapse: low   Safety Plan Reviewed: not applicable    Care Plan Updated: No    Does patient express agreement with the above plan? Yes     Diagnosis:  1. Generalized anxiety disorder    2.  Attention deficit hyperactivity disorder (ADHD), predominantly inattentive type        Referral appointment(s) scheduled? VAISHALI Choi.

## 2024-04-05 ENCOUNTER — OFFICE VISIT (OUTPATIENT)
Dept: BEHAVIORAL HEALTH | Facility: CLINIC | Age: 29
End: 2024-04-05
Payer: COMMERCIAL

## 2024-04-05 DIAGNOSIS — F41.1 GENERALIZED ANXIETY DISORDER: ICD-10-CM

## 2024-04-05 DIAGNOSIS — F90.0 ATTENTION DEFICIT HYPERACTIVITY DISORDER (ADHD), PREDOMINANTLY INATTENTIVE TYPE: ICD-10-CM

## 2024-04-05 PROCEDURE — 90837 PSYTX W PT 60 MINUTES: CPT | Performed by: MARRIAGE & FAMILY THERAPIST

## 2024-04-05 NOTE — PROGRESS NOTES
[facility]   Therapy Progress Note        Name: Baylee Ceballos  MRN: [id number]  : 1995  Age: 28 y.o.  Date of assessment: 2024  PCP: SYDNEE Patrick  Persons in attendance: Patient  Total session time: 56 minutes      Topics addressed in psychotherapy include: Met with the patient in person for an individual counseling session.      Content of Therapy:   The patient reported that she recently had a stressful week where she thought her infant son might need surgery.  She discussed possible medical conditions however it was determined he should be fine with an antacid treatment.  She reported that her  received a message that if he wanted to find out what was going on with his mother, he should check facebook.  The patients  found out that his mother had been in the hospital for five days, it is critically ill.  Patient report she took her  to the report this morning at 4:00 AM.  The patient also reported that she fell going upstairs while carrying her infant son.  The patient discussed anxiety wondering if he was OK.      Therapeutic Intervention:   This session, the therapeutic focus was on validating the normalcy of her experiences.  Worked with the patient to accept her level of skill in managing the difficult situations.  Discussed invalidated her level of parenting.  Provided supportive psychotherapy.    This dictation has been created using voice recognition software and/or scribes. The accuracy of the dictation is limited by the abilities of the software and the expertise of the scribes. I expect there may be some errors of grammar and possibly content. I made every attempt to manually correct the errors within my dictation. However, errors related to voice recognition software and/or scribes may still exist and should be interpreted within the appropriate context.    Objective Observations:   Participation:Active verbal participation,  Attentive, and Engaged   Grooming:Casual   Cognition:Alert and Fully Oriented   Eye Contact:Good   Mood:Euthymic   Affect:Flexible and Full range   Thought Process:Logical and Goal-directed   Speech:Rate within normal limits and Volume within normal limits    Current Risk:   Suicide: low   Homicide: low   Self-Harm: low   Relapse: low   Safety Plan Reviewed: not applicable    Care Plan Updated: No    Does patient express agreement with the above plan? Yes     Diagnosis:  1. Generalized anxiety disorder    2. Attention deficit hyperactivity disorder (ADHD), predominantly inattentive type        Referral appointment(s) scheduled? No       CHLOE Bains

## 2024-04-17 DIAGNOSIS — N81.89 PELVIC FLOOR WEAKNESS: ICD-10-CM

## 2024-04-23 ENCOUNTER — OFFICE VISIT (OUTPATIENT)
Dept: BEHAVIORAL HEALTH | Facility: CLINIC | Age: 29
End: 2024-04-23
Payer: COMMERCIAL

## 2024-04-23 DIAGNOSIS — F41.1 GENERALIZED ANXIETY DISORDER: ICD-10-CM

## 2024-04-23 PROCEDURE — 90837 PSYTX W PT 60 MINUTES: CPT | Performed by: MARRIAGE & FAMILY THERAPIST

## 2024-04-23 NOTE — PROGRESS NOTES
[facility]   Therapy Progress Note        Name: Baylee Ceballos  MRN: [id number]  : 1995  Age: 28 y.o.  Date of assessment: 2024  PCP: SYDNEE Patrick  Persons in attendance: Patient  Total session time: 55 minutes      Topics addressed in psychotherapy include: Met with the patient in person for an individual counseling session.      Content of Therapy:   The patient reported that she working hard to not allow her daughter to feel like a second and stepchild.  Patient reports her own personal history of experiences which brought on these feelings.      Therapeutic Intervention:   This session, the therapeutic focus was on validating the patient's history and allowing her to process the experiences with her daughter and  son.  Discuss differences in parenting styles and in her own outlook on life.  Provided supportive psychotherapy.    This dictation has been created using voice recognition software and/or scribes. The accuracy of the dictation is limited by the abilities of the software and the expertise of the scribes. I expect there may be some errors of grammar and possibly content. I made every attempt to manually correct the errors within my dictation. However, errors related to voice recognition software and/or scribes may still exist and should be interpreted within the appropriate context.    Objective Observations:   Participation:Active verbal participation   Grooming:Casual   Cognition:Alert and Fully Oriented   Eye Contact:Good   Mood:Euthymic   Affect:Flexible and Full range   Thought Process:Logical and Goal-directed   Speech:Rate within normal limits and Volume within normal limits    Current Risk:   Suicide: low   Homicide: low   Self-Harm: low   Relapse: low   Safety Plan Reviewed: not applicable    Care Plan Updated: No    Does patient express agreement with the above plan? Yes     Diagnosis:  1. Generalized anxiety disorder        Referral  appointment(s) scheduled? No       VAISHALI Bains.

## 2024-05-06 ENCOUNTER — HOSPITAL ENCOUNTER (OUTPATIENT)
Dept: LAB | Facility: MEDICAL CENTER | Age: 29
End: 2024-05-06
Attending: OBSTETRICS & GYNECOLOGY
Payer: COMMERCIAL

## 2024-05-06 LAB
EST. AVERAGE GLUCOSE BLD GHB EST-MCNC: 103 MG/DL
HBA1C MFR BLD: 5.2 % (ref 4–5.6)

## 2024-05-07 ENCOUNTER — OFFICE VISIT (OUTPATIENT)
Dept: BEHAVIORAL HEALTH | Facility: CLINIC | Age: 29
End: 2024-05-07
Payer: COMMERCIAL

## 2024-05-07 DIAGNOSIS — F90.0 ATTENTION DEFICIT HYPERACTIVITY DISORDER (ADHD), PREDOMINANTLY INATTENTIVE TYPE: ICD-10-CM

## 2024-05-07 DIAGNOSIS — F41.1 GENERALIZED ANXIETY DISORDER: ICD-10-CM

## 2024-05-07 PROCEDURE — 90837 PSYTX W PT 60 MINUTES: CPT | Performed by: MARRIAGE & FAMILY THERAPIST

## 2024-05-20 NOTE — PROGRESS NOTES
[facility]   Therapy Progress Note        Name: Baylee Ceballos  MRN: [id number]  : 1995  Age: 28 y.o.  Date of assessment: 2024  PCP: SYDNEE Patrick  Persons in attendance: Patient  Total session time: 57 minutes      Topics addressed in psychotherapy include: Met with the patient in person for an individual counseling session.      Content of Therapy:   The patient reported that she has been participating in postpartum physical therapy.  Th patient discussed that she continues to have vivid and weird dreams.  The patient discussed feeling that this pregnancy was very much different than her previous one.  The daughter will soon be visiting her ex  for the summer.    Therapeutic Intervention:   This session, the therapeutic focus was on validating the patient's difference in situations between her two children.  Discussed with the patient the possibility of hormone changes causing nightmares.  Worked with the patient to support her parenting styles.    This dictation has been created using voice recognition software and/or scribes. The accuracy of the dictation is limited by the abilities of the software and the expertise of the scribes. I expect there may be some errors of grammar and possibly content. I made every attempt to manually correct the errors within my dictation. However, errors related to voice recognition software and/or scribes may still exist and should be interpreted within the appropriate context.    Objective Observations:   Participation:Active verbal participation, Attentive, and Engaged   Grooming:Casual   Cognition:Alert and Fully Oriented   Eye Contact:Good   Mood:Euthymic   Affect:Flexible, Full range, and Congruent with content   Thought Process:Logical and Goal-directed   Speech:Rate within normal limits and Volume within normal limits    Current Risk:   Suicide: low   Homicide: low   Self-Harm: low   Relapse: low   Safety Plan Reviewed:  not applicable    Care Plan Updated: No    Does patient express agreement with the above plan? Yes     Diagnosis:  1. Generalized anxiety disorder    2. Attention deficit hyperactivity disorder (ADHD), predominantly inattentive type        Referral appointment(s) scheduled? No       VAISHALI Bains.

## 2024-06-20 ENCOUNTER — OFFICE VISIT (OUTPATIENT)
Dept: BEHAVIORAL HEALTH | Facility: CLINIC | Age: 29
End: 2024-06-20
Payer: COMMERCIAL

## 2024-06-20 DIAGNOSIS — F41.1 GENERALIZED ANXIETY DISORDER: ICD-10-CM

## 2024-06-20 DIAGNOSIS — F90.0 ATTENTION DEFICIT HYPERACTIVITY DISORDER (ADHD), PREDOMINANTLY INATTENTIVE TYPE: ICD-10-CM

## 2024-06-20 PROCEDURE — 90837 PSYTX W PT 60 MINUTES: CPT | Performed by: MARRIAGE & FAMILY THERAPIST

## 2024-06-20 NOTE — PROGRESS NOTES
[facility]   Therapy Progress Note        Name: Baylee Ceballos  MRN: [id number]  : 1995  Age: 28 y.o.  Date of assessment: 2024  PCP: SYDNEE Patrick  Persons in attendance: Patient  Total session time: 58 minutes      Topics addressed in psychotherapy include: Met with the patient in person for an individual counseling session.      Content of Therapy:   The patient discussed that she will be returning to court to change custodies arrangements regarding her daughter. Patient discussed that the current custody arrangements were set as if he were living in [place]. The patient's daughter is currently with her father in [facility].     Therapeutic Intervention:   This session, the therapeutic focus was on Validating the patient's beliefs and progress as a parent. Discussed past relationships and the differences. Provided supportive psychotherapy.    This dictation has been created using voice recognition software and/or scribes. The accuracy of the dictation is limited by the abilities of the software and the expertise of the scribes. I expect there may be some errors of grammar and possibly content. I made every attempt to manually correct the errors within my dictation. However, errors related to voice recognition software and/or scribes may still exist and should be interpreted within the appropriate context.    Objective Observations:   Participation:Active verbal participation, Attentive, and Engaged   Grooming:Casual   Cognition:Alert and Fully Oriented   Eye Contact:Good   Mood:Euthymic   Affect:Flexible and Full range   Thought Process:Logical and Goal-directed   Speech:Rate within normal limits and Volume within normal limits    Current Risk:   Suicide: low   Homicide: low   Self-Harm: low   Relapse: low   Safety Plan Reviewed: not applicable    Care Plan Updated: No    Does patient express agreement with the above plan? Yes     Diagnosis:  1. Generalized  anxiety disorder    2. Attention deficit hyperactivity disorder (ADHD), predominantly inattentive type        Referral appointment(s) scheduled? VAISHALI Choi.

## 2024-07-02 ENCOUNTER — OFFICE VISIT (OUTPATIENT)
Dept: BEHAVIORAL HEALTH | Facility: CLINIC | Age: 29
End: 2024-07-02
Payer: COMMERCIAL

## 2024-07-02 DIAGNOSIS — F41.1 GENERALIZED ANXIETY DISORDER: ICD-10-CM

## 2024-07-02 DIAGNOSIS — F90.0 ATTENTION DEFICIT HYPERACTIVITY DISORDER (ADHD), PREDOMINANTLY INATTENTIVE TYPE: ICD-10-CM

## 2024-07-02 PROCEDURE — 90837 PSYTX W PT 60 MINUTES: CPT | Performed by: MARRIAGE & FAMILY THERAPIST

## 2024-07-16 ENCOUNTER — OFFICE VISIT (OUTPATIENT)
Dept: BEHAVIORAL HEALTH | Facility: CLINIC | Age: 29
End: 2024-07-16
Payer: COMMERCIAL

## 2024-07-16 DIAGNOSIS — F90.0 ATTENTION DEFICIT HYPERACTIVITY DISORDER (ADHD), PREDOMINANTLY INATTENTIVE TYPE: ICD-10-CM

## 2024-07-16 DIAGNOSIS — F41.1 GENERALIZED ANXIETY DISORDER: ICD-10-CM

## 2024-07-16 PROCEDURE — 90837 PSYTX W PT 60 MINUTES: CPT | Performed by: MARRIAGE & FAMILY THERAPIST

## 2024-07-18 ENCOUNTER — PHARMACY VISIT (OUTPATIENT)
Dept: PHARMACY | Facility: MEDICAL CENTER | Age: 29
End: 2024-07-18
Payer: COMMERCIAL

## 2024-07-18 DIAGNOSIS — G43.809 OTHER MIGRAINE WITHOUT STATUS MIGRAINOSUS, NOT INTRACTABLE: ICD-10-CM

## 2024-07-18 PROCEDURE — RXMED WILLOW AMBULATORY MEDICATION CHARGE: Performed by: NURSE PRACTITIONER

## 2024-07-18 RX ORDER — SUMATRIPTAN 50 MG/1
50 TABLET, FILM COATED ORAL
Qty: 10 TABLET | Refills: 3 | Status: SHIPPED | OUTPATIENT
Start: 2024-07-18

## 2024-07-18 RX ORDER — ONDANSETRON 4 MG/1
4 TABLET, ORALLY DISINTEGRATING ORAL EVERY 6 HOURS PRN
Qty: 20 TABLET | Refills: 1 | Status: SHIPPED | OUTPATIENT
Start: 2024-07-18

## 2024-08-19 ENCOUNTER — OFFICE VISIT (OUTPATIENT)
Dept: BEHAVIORAL HEALTH | Facility: CLINIC | Age: 29
End: 2024-08-19
Payer: COMMERCIAL

## 2024-08-19 DIAGNOSIS — F90.0 ATTENTION DEFICIT HYPERACTIVITY DISORDER (ADHD), PREDOMINANTLY INATTENTIVE TYPE: ICD-10-CM

## 2024-08-19 DIAGNOSIS — F41.1 GENERALIZED ANXIETY DISORDER: ICD-10-CM

## 2024-08-19 PROCEDURE — 90837 PSYTX W PT 60 MINUTES: CPT | Performed by: MARRIAGE & FAMILY THERAPIST
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
## 2024-09-10 ENCOUNTER — OFFICE VISIT (OUTPATIENT)
Dept: BEHAVIORAL HEALTH | Facility: CLINIC | Age: 29
End: 2024-09-10
Payer: COMMERCIAL

## 2024-09-10 DIAGNOSIS — F90.0 ATTENTION DEFICIT HYPERACTIVITY DISORDER (ADHD), PREDOMINANTLY INATTENTIVE TYPE: ICD-10-CM

## 2024-09-10 DIAGNOSIS — F41.1 GENERALIZED ANXIETY DISORDER: ICD-10-CM

## 2024-09-10 PROCEDURE — 90837 PSYTX W PT 60 MINUTES: CPT | Performed by: MARRIAGE & FAMILY THERAPIST

## 2024-09-10 NOTE — PROGRESS NOTES
[facility]   Therapy Progress Note        Name: Baylee Ceballos  MRN: [id number]  : 1995  Age: 29 y.o.  Date of assessment: 9/10/2024  PCP: SYDNEE Patrick  Persons in attendance: Patient  Total session time: 56 minutes      Topics addressed in psychotherapy include: Met with the patient in person for an individual counseling session.      Content of Therapy:   The patient discussed that her daughter was able to tell her father her feelings about taking her adhd medications when she is with him.  Patient discussed that she is getting more sleep as her infant son is sleeping more himself.  Patient discussed various pier and alexis issues including schedules, boundaries, and self care.  Therapeutic Intervention:   This session, the therapeutic focus was on validating the patients positive direction.  Encourage patient to practice of compassion and set realistic goals.  Empowered patient to continue to advocate for her needs and make informed decisions.  Provided supportive psychotherapy.     This dictation has been created using voice recognition software and/or scribes. The accuracy of the dictation is limited by the abilities of the software and the expertise of the scribes. I expect there may be some errors of grammar and possibly content. I made every attempt to manually correct the errors within my dictation. However, errors related to voice recognition software and/or scribes may still exist and should be interpreted within the appropriate context.    Objective Observations:   Participation:Active verbal participation, Attentive, and Engaged   Grooming:Casual   Cognition:Alert and Fully Oriented   Eye Contact:Good   Mood:Euthymic   Affect:Flexible and Full range   Thought Process:Logical and Goal-directed   Speech:Rate within normal limits and Volume within normal limits    Current Risk:   Suicide: low   Homicide: low   Self-Harm: low   Relapse: low   Safety Plan Reviewed:  not applicable    Care Plan Updated: No    Does patient express agreement with the above plan? Yes     Diagnosis:  1. Generalized anxiety disorder    2. Attention deficit hyperactivity disorder (ADHD), predominantly inattentive type        Referral appointment(s) scheduled? No       VAISHALI Bains.

## 2024-09-24 ENCOUNTER — OFFICE VISIT (OUTPATIENT)
Dept: BEHAVIORAL HEALTH | Facility: CLINIC | Age: 29
End: 2024-09-24
Payer: COMMERCIAL

## 2024-09-24 DIAGNOSIS — F41.1 GENERALIZED ANXIETY DISORDER: ICD-10-CM

## 2024-09-24 DIAGNOSIS — F90.0 ATTENTION DEFICIT HYPERACTIVITY DISORDER (ADHD), PREDOMINANTLY INATTENTIVE TYPE: ICD-10-CM
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
## 2024-10-04 ENCOUNTER — PHARMACY VISIT (OUTPATIENT)
Dept: PHARMACY | Facility: MEDICAL CENTER | Age: 29
End: 2024-10-04
Payer: COMMERCIAL

## 2024-10-04 PROCEDURE — RXMED WILLOW AMBULATORY MEDICATION CHARGE: Performed by: INTERNAL MEDICINE

## 2024-10-04 RX ORDER — COVID-19 VACCINE, MRNA 0.04 MG/.418ML
INJECTION, SUSPENSION INTRAMUSCULAR
Qty: 0.3 ML | Refills: 0 | OUTPATIENT
Start: 2024-10-04

## 2024-10-04 RX ORDER — INFLUENZA A VIRUS A/VICTORIA/4897/2022 IVR-238 (H1N1) ANTIGEN (FORMALDEHYDE INACTIVATED), INFLUENZA A VIRUS A/CALIFORNIA/122/2022 SAN-022 (H3N2) ANTIGEN (FORMALDEHYDE INACTIVATED), AND INFLUENZA B VIRUS B/MICHIGAN/01/2021 ANTIGEN (FORMALDEHYDE INACTIVATED) 15; 15; 15 MG/.5ML; MG/.5ML; MG/.5ML
INJECTION, SUSPENSION INTRAMUSCULAR
Qty: 0.5 ML | Refills: 0 | OUTPATIENT
Start: 2024-10-04

## 2024-11-01 ENCOUNTER — APPOINTMENT (OUTPATIENT)
Dept: BEHAVIORAL HEALTH | Facility: CLINIC | Age: 29
End: 2024-11-01
Payer: COMMERCIAL

## 2024-11-01 DIAGNOSIS — F41.1 GENERALIZED ANXIETY DISORDER: ICD-10-CM

## 2024-11-01 DIAGNOSIS — F90.0 ATTENTION DEFICIT HYPERACTIVITY DISORDER (ADHD), PREDOMINANTLY INATTENTIVE TYPE: ICD-10-CM

## 2024-11-01 PROCEDURE — 90837 PSYTX W PT 60 MINUTES: CPT | Performed by: MARRIAGE & FAMILY THERAPIST

## 2024-11-02 NOTE — PROGRESS NOTES
[facility]   Therapy Progress Note        Name: Baylee Ceballos  MRN: [id number]  : 1995  Age: 29 y.o.  Date of assessment: 2024  PCP: SYDNEE Patrick  Persons in attendance: Patient  Total session time: 58 minutes      Topics addressed in psychotherapy include: Met with the patient in person for an individual counseling session.    Content of Therapy:   The patient discussed that she feels stressors from doing “everything” at home.  The patient has a 10 month old child and an adolescent.  Patient discussed that she works hard and balancing her 's and children's needs.  Patient discussed that her daughter is getting glasses and her braces off.    Therapeutic Intervention:   This session, the therapeutic focus was on assisting the patient in continuing to work on setting priorities of herself and making that a necessity.  Worked with the patient understand as she completes her own self care, she has an increased ability to care for others.  Provided supportive psychotherapy.  This dictation has been created using voice recognition software and/or scribes. The accuracy of the dictation is limited by the abilities of the software and the expertise of the scribes. I expect there may be some errors of grammar and possibly content. I made every attempt to manually correct the errors within my dictation. However, errors related to voice recognition software and/or scribes may still exist and should be interpreted within the appropriate context.    Objective Observations:   Participation:Active verbal participation, Attentive, Engaged, and Open to feedback   Grooming:Casual   Cognition:Alert and Fully Oriented   Eye Contact:Good   Mood:Euthymic   Affect:Flexible and Full range   Thought Process:Logical and Goal-directed   Speech:Rate within normal limits and Volume within normal limits    Current Risk:   Suicide: low   Homicide: low   Self-Harm: low   Relapse: low   Safety  Plan Reviewed: not applicable    Care Plan Updated: No    Does patient express agreement with the above plan? Yes     Diagnosis:  1. Generalized anxiety disorder    2. Attention deficit hyperactivity disorder (ADHD), predominantly inattentive type        Referral appointment(s) scheduled? VAISHALI Choi.

## 2024-11-25 ENCOUNTER — OFFICE VISIT (OUTPATIENT)
Dept: BEHAVIORAL HEALTH | Facility: CLINIC | Age: 29
End: 2024-11-25
Payer: COMMERCIAL

## 2024-11-25 DIAGNOSIS — F41.1 GENERALIZED ANXIETY DISORDER: ICD-10-CM

## 2024-11-25 DIAGNOSIS — F90.0 ATTENTION DEFICIT HYPERACTIVITY DISORDER (ADHD), PREDOMINANTLY INATTENTIVE TYPE: ICD-10-CM

## 2024-11-25 PROCEDURE — 90837 PSYTX W PT 60 MINUTES: CPT | Performed by: MARRIAGE & FAMILY THERAPIST

## 2024-11-25 NOTE — PROGRESS NOTES
[facility]   Therapy Progress Note        Name: Baylee Ceballos  MRN: [id number]  : 1995  Age: 29 y.o.  Date of assessment: 2024  PCP: SYDNEE Patrick  Persons in attendance: {[facility] HE[facility] ATTENDEES:39234016}  Total session time: *** minutes      Topics addressed in psychotherapy include: Met with the patient in person for an individual counseling session.    Content of Therapy:   The patient discussed that she and her family are going to [place] this week for thanksgiving with friends.  Next year they are planning on [place] at [employer].  Patient discussed that she felt she was short with her  recently after a particularly busy day.  Patient discussed stressors of having a one year old.  Therapeutic Intervention:   This session, the therapeutic focus was on validating the patient's feelings regarding her fatigue with a one year old.  Discussed the patient's a self care as she has started painting again and is planning for the future.  Worked with the patient has she identified other opportunity's for self care and changes which will be coming up shortly as her son grows and develops.  Provided supportive psychotherapy  This dictation has been created using voice recognition software and/or scribes. The accuracy of the dictation is limited by the abilities of the software and the expertise of the scribes. I expect there may be some errors of grammar and possibly content. I made every attempt to manually correct the errors within my dictation. However, errors related to voice recognition software and/or scribes may still exist and should be interpreted within the appropriate context.    Objective Observations:   Participation:{[facility] PARTICIPATION MEASURES:84597411}   Grooming:{[facility]}   Cognition:{[facility] ORIENTATION:82779503}   Eye Contact:{[facility] EYE CONTACT:73123827}   Mood:{[facility] MOOD:05761457}   Affect:{[facility]  AFFECT:16784725}   Thought Process:{[facility] THOUGHT PROCESS:02342623}   Speech:{[facility] SPEECH:40091253}    Current Risk:   Suicide: {Desc; low/moderate/high:878497}   Homicide: {Desc; low/moderate/high:145338}   Self-Harm: {Desc; low/moderate/high:798209}   Relapse: {Desc; low/moderate/high:870334}   Safety Plan Reviewed: {Response; yes/no/na:20949}    Care Plan Updated: {Yes/No/WC:746039}    Does patient express agreement with the above plan? {Yes/No/WC:186576}     Diagnosis:  No diagnosis found.    Referral appointment(s) scheduled? {Yes/No/WC:638347}       VAISHALI Bains.

## 2024-12-13 ENCOUNTER — OFFICE VISIT (OUTPATIENT)
Dept: BEHAVIORAL HEALTH | Facility: CLINIC | Age: 29
End: 2024-12-13
Payer: COMMERCIAL

## 2024-12-13 DIAGNOSIS — F41.1 GENERALIZED ANXIETY DISORDER: ICD-10-CM

## 2024-12-13 DIAGNOSIS — F90.0 ATTENTION DEFICIT HYPERACTIVITY DISORDER (ADHD), PREDOMINANTLY INATTENTIVE TYPE: ICD-10-CM

## 2024-12-13 PROCEDURE — 90837 PSYTX W PT 60 MINUTES: CPT | Performed by: MARRIAGE & FAMILY THERAPIST

## 2024-12-13 NOTE — PROGRESS NOTES
[facility]   Therapy Progress Note        Name: Baylee Ceballos  MRN: [id number]  : 1995  Age: 29 y.o.  Date of assessment: 2024  PCP: SYDNEE Patrick  Persons in attendance: Patient  Total session time: 56 minutes      Topics addressed in psychotherapy include:   Met with the patient in person for an individual counseling session. The patient was last seen on 2024.  Content of Therapy:   The patient discussed she has been very busy month with her children's birthdays sandwiching [place].  Patient discussed upcoming and distant vacation planning.  Patient discussed continuing struggles with procrastination and that her procrastination has snowballed into feelings of low self worth.  Patient discussed that there are times that she finds it difficult to get out of bed.  Patient discussed that she would like to feel less anxious and to have better self esteem.  Therapeutic Intervention:   This session, the therapeutic focus was on validating the patient's experiences.  Worked with the patient normalizing her experience with a .  Discussed with the patient her own childhood experience and worked on implementing coping skills.  Worked with the patient to develop treatment plan.  Plan:  Work with patient on understanding thoughts feelings and behavior components of anxiety and treatment.   Impression:   1. Generalized anxiety disorder - Maintaining  2. Attention deficit hyperactivity disorder (ADHD), predominantly inattentive type - Maintaining  This dictation has been created using voice recognition software and/or scribes. The accuracy of the dictation is limited by the abilities of the software and the expertise of the scribes. I expect there may be some errors of grammar and possibly content. I made every attempt to manually correct the errors within my dictation. However, errors related to voice recognition software and/or scribes may still exist and  should be interpreted within the appropriate context.    Objective Observations:   Participation:Active verbal participation, Attentive, Engaged, and Open to feedback   Grooming:Casual   Cognition:Alert and Fully Oriented   Eye Contact:Good   Mood:Euthymic   Affect:Flexible, Full range, and Congruent with content   Thought Process:Logical and Goal-directed   Speech:Rate within normal limits and Volume within normal limits    Current Risk:   Suicide: low   Homicide: low   Self-Harm: low   Relapse: low   Safety Plan Reviewed: not applicable    Care Plan Updated: Yes    Does patient express agreement with the above plan? Yes     Diagnosis:  1. Generalized anxiety disorder    2. Attention deficit hyperactivity disorder (ADHD), predominantly inattentive type        Referral appointment(s) scheduled? No       VAISHALI Bains.

## 2025-01-02 ENCOUNTER — APPOINTMENT (OUTPATIENT)
Dept: BEHAVIORAL HEALTH | Facility: CLINIC | Age: 30
End: 2025-01-02
Payer: COMMERCIAL

## 2025-01-02 DIAGNOSIS — F90.0 ATTENTION DEFICIT HYPERACTIVITY DISORDER (ADHD), PREDOMINANTLY INATTENTIVE TYPE: ICD-10-CM

## 2025-01-02 DIAGNOSIS — F41.1 GENERALIZED ANXIETY DISORDER: ICD-10-CM

## 2025-01-02 PROCEDURE — 90837 PSYTX W PT 60 MINUTES: CPT | Performed by: MARRIAGE & FAMILY THERAPIST

## 2025-01-02 NOTE — PROGRESS NOTES
[facility]   Therapy Progress Note        Name: Baylee Ceballos  MRN: [id number]  : 1995  Age: 29 y.o.  Date of assessment: 2025  PCP: SYDNEE Patrick  Persons in attendance: Patient  Total session time: 56 minutes      Topics addressed in psychotherapy include: Met with the patient in person for an individual counseling session. The patient was last seen by this clinician on 2024.  Content of Therapy:   Met with the patient for an individual counseling session.  The patient discussed getting over the stress of the holidays and children's birthdays.  Patient discussed some recent positive developments as her one year old son is sleeping better and longer.  The family peers to have good cohesion and is supportive.  Patient discussed possibly returning to ADHD medications after she completes breastfeeding.  Patient wondered what she would be able to accomplish at that point.  Therapeutic Intervention:   This session, the therapeutic focus was on validating the patient's progress while understanding that certain seasons may be more challenging.  Worked with the patient on but acceptance of those patterns.  Discussed with the patient continued self care.  Discussed with the patient continued growth in self awareness.  Plan:  Work with patient on managing anxiety and adhd symptoms.   Impression:   1. Generalized anxiety disorder - improving  2. Attention deficit hyperactivity disorder (ADHD), predominantly inattentive type - maintaining  This dictation has been created using voice recognition software and/or scribes. The accuracy of the dictation is limited by the abilities of the software and the expertise of the scribes. I expect there may be some errors of grammar and possibly content. I made every attempt to manually correct the errors within my dictation. However, errors related to voice recognition software and/or scribes may still exist and should be  interpreted within the appropriate context.    Objective Observations:   Participation:Active verbal participation, Attentive, and Engaged   Grooming:Casual   Cognition:Alert and Fully Oriented   Eye Contact:Good   Mood:Euthymic   Affect:Flexible and Full range   Thought Process:Logical and Goal-directed   Speech:Rate within normal limits and Volume within normal limits    Current Risk:   Suicide: low   Homicide: low   Self-Harm: low   Relapse: low   Safety Plan Reviewed: not applicable    Care Plan Updated: No    Does patient express agreement with the above plan? Yes     Diagnosis:  1. Generalized anxiety disorder    2. Attention deficit hyperactivity disorder (ADHD), predominantly inattentive type        Referral appointment(s) scheduled? No       CHLOE Bains

## 2025-02-07 ENCOUNTER — OFFICE VISIT (OUTPATIENT)
Dept: BEHAVIORAL HEALTH | Facility: CLINIC | Age: 30
End: 2025-02-07
Payer: COMMERCIAL

## 2025-02-07 DIAGNOSIS — F41.1 GENERALIZED ANXIETY DISORDER: ICD-10-CM

## 2025-02-07 DIAGNOSIS — F90.0 ATTENTION DEFICIT HYPERACTIVITY DISORDER (ADHD), PREDOMINANTLY INATTENTIVE TYPE: ICD-10-CM

## 2025-02-07 NOTE — PROGRESS NOTES
[facility]   Therapy Progress Note        Name: Baylee Ceballos  MRN: [id number]  : 1995  Age: 29 y.o.  Date of assessment: 2025  PCP: SYDNEE Patrick  Persons in attendance: Patient  Total session time: 55 minutes      Topics addressed in psychotherapy include: Met with the patient in office for an individual therapy session.  The patient was last seen by this clinician on 2025  Content of Therapy:   The patient discussed that she successfully had her daughter's custody hearing moved from [place] to [place].  Patient discussed that the circumstances have changed significantly and she is looking for a significant changes in the agreement.  Patient reports she feels she is doing better with her seasonal depression this year.  The patient discussed parenting approaches.    Therapeutic Intervention:   This session, the therapeutic focus was on focusing on the patient's continued progress.  Discussed with the patient anxiety and seasonal depression.  Discussed with the patient not only children's developmental stages but also adult and relational developmental stages.  Plan:  Continue work with the patient towards discharge.    Impression:   1. Generalized anxiety disorder - improving  2. Attention deficit hyperactivity disorder (ADHD), predominantly inattentive type - improving  This dictation has been created using voice recognition software and/or scribes. The accuracy of the dictation is limited by the abilities of the software and the expertise of the scribes. I expect there may be some errors of grammar and possibly content. I made every attempt to manually correct the errors within my dictation. However, errors related to voice recognition software and/or scribes may still exist and should be interpreted within the appropriate context.    Objective Observations:   Participation:Active verbal participation, Attentive, Engaged, and Open to  feedback   Grooming:Casual   Cognition:Alert and Fully Oriented   Eye Contact:Good   Mood:Euthymic and Anxious   Affect:Flexible and Full range   Thought Process:Logical and Goal-directed   Speech:Rate within normal limits and Volume within normal limits    Current Risk:   Suicide: low   Homicide: low   Self-Harm: low   Relapse: low   Safety Plan Reviewed: not applicable    Care Plan Updated: No    Does patient express agreement with the above plan? Yes     Diagnosis:  1. Generalized anxiety disorder    2. Attention deficit hyperactivity disorder (ADHD), predominantly inattentive type        Referral appointment(s) scheduled? No       CHLOE Bains

## 2025-02-25 ENCOUNTER — OFFICE VISIT (OUTPATIENT)
Dept: BEHAVIORAL HEALTH | Facility: CLINIC | Age: 30
End: 2025-02-25
Payer: COMMERCIAL

## 2025-02-25 DIAGNOSIS — F90.0 ATTENTION DEFICIT HYPERACTIVITY DISORDER (ADHD), PREDOMINANTLY INATTENTIVE TYPE: ICD-10-CM

## 2025-02-25 DIAGNOSIS — F41.1 GENERALIZED ANXIETY DISORDER: ICD-10-CM

## 2025-02-26 NOTE — PROGRESS NOTES
[facility]   Therapy Progress Note        Name: Baylee Ceballos  MRN: [id number]  : 1995  Age: 29 y.o.  Date of assessment: 2025  PCP: SYDNEE Patrick  Persons in attendance: Patient  Total session time: 56 minutes      Topics addressed in psychotherapy include:   Met with the patient in office for an individual therapy session.  The patient was last seen by this clinician on 2025.2025  Content of Therapy:   Met with the patient for an individual counseling session.  The patient discussed having increased anxiety as she has an upcoming court custody hearing with her ex .  Patient fears that she will be found as an unfit mother.  Patient worries that she is not done good enough as a parent.  The patient does realize in are logical brain that she is a fit parent and doing well for her children.  Therapeutic Intervention:   This session, the therapeutic focus was on working with the patient through cognitive behavioral therapy in addressing those false beliefs.  Acknowledge with the patient that no one is perfect.  Reviewed with the patient her progress and determination and giving her children a good life.  Worked with the patient to recognize her growth in self awareness as she differentiates from her family of origin.  Plan: Continue working with the patient in managing emotions.    Impression:   1.Generalized anxiety disorder - struggling  2.Attention deficit hyperactivity disorder (ADHD), predominantly inattentive type - returning to medications soon  This dictation has been created using voice recognition software and/or scribes. The accuracy of the dictation is limited by the abilities of the software and the expertise of the scribes. I expect there may be some errors of grammar and possibly content. I made every attempt to manually correct the errors within my dictation. However, errors related to voice recognition software and/or  scribes may still exist and should be interpreted within the appropriate context.    Objective Observations:   Participation:Active verbal participation, Attentive, Engaged, and Open to feedback   Grooming:Casual   Cognition:Alert and Fully Oriented   Eye Contact:Good   Mood:Euthymic and Anxious   Affect:Flexible and Full range   Thought Process:Logical and Goal-directed   Speech:Rate within normal limits and Volume within normal limits    Current Risk:   Suicide: low   Homicide: low   Self-Harm: low   Relapse: low   Safety Plan Reviewed: not applicable    Care Plan Updated: No    Does patient express agreement with the above plan? Yes     Diagnosis:  1. Generalized anxiety disorder    2. Attention deficit hyperactivity disorder (ADHD), predominantly inattentive type        Referral appointment(s) scheduled? No       VAISHALI Bains.

## 2025-03-11 ENCOUNTER — OFFICE VISIT (OUTPATIENT)
Dept: BEHAVIORAL HEALTH | Facility: CLINIC | Age: 30
End: 2025-03-11
Payer: COMMERCIAL

## 2025-03-11 DIAGNOSIS — F41.1 GENERALIZED ANXIETY DISORDER: ICD-10-CM

## 2025-03-11 DIAGNOSIS — F90.0 ATTENTION DEFICIT HYPERACTIVITY DISORDER (ADHD), PREDOMINANTLY INATTENTIVE TYPE: ICD-10-CM

## 2025-03-11 PROCEDURE — 90837 PSYTX W PT 60 MINUTES: CPT | Performed by: MARRIAGE & FAMILY THERAPIST

## 2025-03-18 ENCOUNTER — APPOINTMENT (OUTPATIENT)
Dept: MEDICAL GROUP | Facility: MEDICAL CENTER | Age: 30
End: 2025-03-18
Payer: COMMERCIAL

## 2025-03-18 ENCOUNTER — HOSPITAL ENCOUNTER (OUTPATIENT)
Facility: MEDICAL CENTER | Age: 30
End: 2025-03-18
Attending: NURSE PRACTITIONER
Payer: COMMERCIAL

## 2025-03-18 VITALS
HEIGHT: 63 IN | BODY MASS INDEX: 27.29 KG/M2 | RESPIRATION RATE: 16 BRPM | SYSTOLIC BLOOD PRESSURE: 104 MMHG | WEIGHT: 154 LBS | TEMPERATURE: 97.2 F | DIASTOLIC BLOOD PRESSURE: 72 MMHG | OXYGEN SATURATION: 97 % | HEART RATE: 93 BPM

## 2025-03-18 DIAGNOSIS — F90.0 ATTENTION DEFICIT HYPERACTIVITY DISORDER (ADHD), PREDOMINANTLY INATTENTIVE TYPE: ICD-10-CM

## 2025-03-18 DIAGNOSIS — Z79.899 MEDICATION MANAGEMENT: ICD-10-CM

## 2025-03-18 DIAGNOSIS — L29.9 SCALP ITCH: ICD-10-CM

## 2025-03-18 PROBLEM — O21.9 NAUSEA AND VOMITING DURING PREGNANCY: Status: RESOLVED | Noted: 2023-06-22 | Resolved: 2025-03-18

## 2025-03-18 PROCEDURE — 99213 OFFICE O/P EST LOW 20 MIN: CPT | Performed by: NURSE PRACTITIONER

## 2025-03-18 PROCEDURE — 80307 DRUG TEST PRSMV CHEM ANLYZR: CPT

## 2025-03-18 PROCEDURE — 3074F SYST BP LT 130 MM HG: CPT | Performed by: NURSE PRACTITIONER

## 2025-03-18 PROCEDURE — RXMED WILLOW AMBULATORY MEDICATION CHARGE: Performed by: NURSE PRACTITIONER

## 2025-03-18 PROCEDURE — 3078F DIAST BP <80 MM HG: CPT | Performed by: NURSE PRACTITIONER

## 2025-03-18 RX ORDER — METHYLPHENIDATE HYDROCHLORIDE 18 MG/1
18 TABLET ORAL EVERY MORNING
Qty: 14 TABLET | Refills: 0 | Status: SHIPPED | OUTPATIENT
Start: 2025-03-18 | End: 2025-03-26

## 2025-03-18 ASSESSMENT — PATIENT HEALTH QUESTIONNAIRE - PHQ9: CLINICAL INTERPRETATION OF PHQ2 SCORE: 0

## 2025-03-19 ENCOUNTER — PHARMACY VISIT (OUTPATIENT)
Dept: PHARMACY | Facility: MEDICAL CENTER | Age: 30
End: 2025-03-19
Payer: COMMERCIAL

## 2025-03-20 ENCOUNTER — RESULTS FOLLOW-UP (OUTPATIENT)
Dept: MEDICAL GROUP | Facility: MEDICAL CENTER | Age: 30
End: 2025-03-20

## 2025-03-20 LAB
AMPHET CTO UR CFM-MCNC: NEGATIVE NG/ML
BARBITURATES CTO UR CFM-MCNC: NEGATIVE NG/ML
BENZODIAZ CTO UR CFM-MCNC: NEGATIVE NG/ML
CANNABINOIDS CTO UR CFM-MCNC: NEGATIVE NG/ML
COCAINE CTO UR CFM-MCNC: NEGATIVE NG/ML
CREAT UR-MCNC: 132.3 MG/DL (ref 20–400)
DRUG COMMENT 753798: NORMAL
METHADONE CTO UR CFM-MCNC: NEGATIVE NG/ML
OPIATES CTO UR CFM-MCNC: NEGATIVE NG/ML
PCP CTO UR CFM-MCNC: NEGATIVE NG/ML
PROPOXYPH CTO UR CFM-MCNC: NEGATIVE NG/ML

## 2025-03-20 NOTE — PROGRESS NOTES
[facility]   Therapy Progress Note        Name: Baylee Ceballos  MRN: [id number]  : 1995  Age: 29 y.o.  Date of assessment: 3/11/2025  PCP: SYDNEE Patrick  Persons in attendance: Patient  Total session time: 56 minutes      Topics addressed in psychotherapy include: Met with the patient in office for an individual therapy session.  The patient was last seen by this clinician on 2025.  Content of Therapy:   Met with the patient for an individual counseling session.  The patient discussed having increased anxiety as she has an upcoming court custody hearing with her ex .  Patient fears that she will be found as an unfit mother.  Patient worries that she is not done good enough as a parent.  The patient does realize in are logical brain that she is a fit parent and doing well for her children.  Therapeutic Intervention:   This session, the therapeutic focus was on working with the patient through cognitive behavioral therapy in addressing those false beliefs.  Acknowledge with the patient that no one is perfect.  Reviewed with the patient her progress and determination and giving her children a good life.  Worked with the patient to recognize her growth in self awareness as she differentiates from her family of origin.  Plan: Continue working with the patient in managing emotions.    Impression:   1.Generalized anxiety disorder - struggling  2.Attention deficit hyperactivity disorder (ADHD), predominantly inattentive type - returning to medications soon    This dictation has been created using voice recognition software and/or scribes. The accuracy of the dictation is limited by the abilities of the software and the expertise of the scribes. I expect there may be some errors of grammar and possibly content. I made every attempt to manually correct the errors within my dictation. However, errors related to voice recognition software and/or scribes may still  exist and should be interpreted within the appropriate context.    Objective Observations:   Participation:Active verbal participation, Attentive, and Engaged   Grooming:Casual   Cognition:Alert and Fully Oriented   Eye Contact:Good   Mood:Euthymic and Anxious   Affect:Flexible, Full range, and Congruent with content   Thought Process:Logical and Goal-directed   Speech:Rate within normal limits and Volume within normal limits    Current Risk:   Suicide: low   Homicide: low   Self-Harm: low   Relapse: low   Safety Plan Reviewed: not applicable    Care Plan Updated: No    Does patient express agreement with the above plan? Yes     Diagnosis:  1. Generalized anxiety disorder    2. Attention deficit hyperactivity disorder (ADHD), predominantly inattentive type        Referral appointment(s) scheduled? No       VAISHALI Bains.

## 2025-03-26 DIAGNOSIS — F90.0 ATTENTION DEFICIT HYPERACTIVITY DISORDER (ADHD), PREDOMINANTLY INATTENTIVE TYPE: ICD-10-CM

## 2025-03-26 PROCEDURE — RXMED WILLOW AMBULATORY MEDICATION CHARGE: Performed by: NURSE PRACTITIONER

## 2025-03-26 RX ORDER — METHYLPHENIDATE HYDROCHLORIDE 27 MG/1
27 TABLET ORAL EVERY MORNING
Qty: 14 TABLET | Refills: 0 | Status: SHIPPED | OUTPATIENT
Start: 2025-03-26 | End: 2025-04-12

## 2025-03-29 ENCOUNTER — PHARMACY VISIT (OUTPATIENT)
Dept: PHARMACY | Facility: MEDICAL CENTER | Age: 30
End: 2025-03-29
Payer: COMMERCIAL

## 2025-04-10 NOTE — Clinical Note
REFERRAL APPROVAL NOTICE         Sent on April 10, 2025                   Baylee Garlandoe  [address] [place] [address]                   Dear MsValdez Bogue Chitto,    After a careful review of the medical information and benefit coverage, [facility] has processed your referral. See below for additional details.    If applicable, you must be actively enrolled with your insurance for coverage of the authorized service. If you have any questions regarding your coverage, please contact your insurance directly.    REFERRAL INFORMATION   Referral #:  [id number]  Referred-To Department    Referred-By Provider:  Dermatology    SYDNEE Patrick   [facility]      [address]  [place] [address]  [phone] [address]  [place] [id number] [phone]    Referral Start Date:  03/18/2025  Referral End Date:   03/18/2026             SCHEDULING  If you do not already have an appointment, please call [phone] to make an appointment.     MORE INFORMATION  If you do not already have a "[email or web address] account, sign up at: [email or web address]  You can access your medical information, make appointments, see lab results, billing information, and more.  If you have questions regarding this referral, please contact  the [facility] Referrals department at:             [phone]. Monday - Friday 8:00AM - 5:00PM.     Sincerely,    [facility]

## 2025-04-15 DIAGNOSIS — F90.0 ATTENTION DEFICIT HYPERACTIVITY DISORDER (ADHD), PREDOMINANTLY INATTENTIVE TYPE: ICD-10-CM

## 2025-04-15 PROCEDURE — RXMED WILLOW AMBULATORY MEDICATION CHARGE: Performed by: NURSE PRACTITIONER

## 2025-04-15 RX ORDER — METHYLPHENIDATE HYDROCHLORIDE 36 MG/1
36 TABLET ORAL EVERY MORNING
Qty: 14 TABLET | Refills: 0 | Status: SHIPPED | OUTPATIENT
Start: 2025-04-15 | End: 2025-04-30

## 2025-04-16 ENCOUNTER — PHARMACY VISIT (OUTPATIENT)
Dept: PHARMACY | Facility: MEDICAL CENTER | Age: 30
End: 2025-04-16
Payer: COMMERCIAL

## 2025-04-24 ENCOUNTER — OFFICE VISIT (OUTPATIENT)
Dept: BEHAVIORAL HEALTH | Facility: CLINIC | Age: 30
End: 2025-04-24
Payer: COMMERCIAL

## 2025-04-24 DIAGNOSIS — F90.0 ATTENTION DEFICIT HYPERACTIVITY DISORDER (ADHD), PREDOMINANTLY INATTENTIVE TYPE: ICD-10-CM

## 2025-04-24 DIAGNOSIS — F41.1 GENERALIZED ANXIETY DISORDER: ICD-10-CM

## 2025-04-24 PROCEDURE — 90837 PSYTX W PT 60 MINUTES: CPT | Performed by: MARRIAGE & FAMILY THERAPIST

## 2025-04-24 NOTE — PROGRESS NOTES
[facility]   Therapy Progress Note        Name: Baylee Ceballos  MRN: [id number]  : 1995  Age: 29 y.o.  Date of assessment: 2025  PCP: SYDNEE Patrick  Persons in attendance: Patient  Total session time: 57 minutes      Topics addressed in psychotherapy include:   Met with the patient in office for an individual therapy session.  The patient was last seen by this clinician on 2025.    Content of Therapy:   The patient discussed that court with her ex  went very well.  The patient discussed that she was able to get which you felt was appropriate accommodations.  The patient discussed that she has been back on a low dose of her adhd medications with positive results.  Patient discussed that her ex  is requiring their daughter to have a child psychiatrist prescribed her adhd medications.    Therapeutic Intervention:   This session, the therapeutic focus was on validating the patient's progress while correcting her previous negative thinking patterns.  Worked with the patient on understanding her ex husbands need for control within the situation.  Worked with the patient on continued progress.    Plan: Continue working with the patient in managing emotions.      Impression:   1.Generalized anxiety disorder - improving  2.Attention deficit hyperactivity disorder (ADHD), predominantly inattentive type - returned to medication, improving     This dictation has been created using voice recognition software and/or scribes. The accuracy of the dictation is limited by the abilities of the software and the expertise of the scribes. I expect there may be some errors of grammar and possibly content. I made every attempt to manually correct the errors within my dictation. However, errors related to voice recognition software and/or scribes may still exist and should be interpreted within the appropriate context.    Objective Observations:   Participation:Active  verbal participation, Attentive, and Engaged   Grooming:Casual   Cognition:Alert and Fully Oriented   Eye Contact:Good   Mood:Euthymic and Anxious   Affect:Flexible and Full range   Thought Process:Logical and Goal-directed   Speech:Rate within normal limits and Volume within normal limits    Current Risk:   Suicide: low   Homicide: low   Self-Harm: low   Relapse: low   Safety Plan Reviewed: not applicable    Care Plan Updated: No    Does patient express agreement with the above plan? Yes     Diagnosis:  1. Generalized anxiety disorder    2. Attention deficit hyperactivity disorder (ADHD), predominantly inattentive type        Referral appointment(s) scheduled? No       CHLOE Bains

## 2025-05-01 DIAGNOSIS — F90.0 ATTENTION DEFICIT HYPERACTIVITY DISORDER (ADHD), PREDOMINANTLY INATTENTIVE TYPE: ICD-10-CM

## 2025-05-01 PROCEDURE — RXMED WILLOW AMBULATORY MEDICATION CHARGE: Performed by: NURSE PRACTITIONER

## 2025-05-01 RX ORDER — METHYLPHENIDATE HYDROCHLORIDE 54 MG/1
54 TABLET ORAL EVERY MORNING
Qty: 14 TABLET | Refills: 0 | Status: SHIPPED | OUTPATIENT
Start: 2025-05-01 | End: 2025-05-22 | Stop reason: SDUPTHER

## 2025-05-01 NOTE — PROGRESS NOTES
Pt tolerating Concerta 36-noticing some improvement in her symptoms  Recommend increasing to 54mg.

## 2025-05-02 ENCOUNTER — PHARMACY VISIT (OUTPATIENT)
Dept: PHARMACY | Facility: MEDICAL CENTER | Age: 30
End: 2025-05-02
Payer: COMMERCIAL

## 2025-05-15 ENCOUNTER — OFFICE VISIT (OUTPATIENT)
Dept: BEHAVIORAL HEALTH | Facility: CLINIC | Age: 30
End: 2025-05-15
Payer: COMMERCIAL

## 2025-05-15 DIAGNOSIS — F90.0 ATTENTION DEFICIT HYPERACTIVITY DISORDER (ADHD), PREDOMINANTLY INATTENTIVE TYPE: ICD-10-CM

## 2025-05-15 DIAGNOSIS — F41.1 GENERALIZED ANXIETY DISORDER: Primary | ICD-10-CM

## 2025-05-15 PROCEDURE — 90837 PSYTX W PT 60 MINUTES: CPT | Performed by: MARRIAGE & FAMILY THERAPIST

## 2025-05-15 NOTE — PROGRESS NOTES
[facility]   Therapy Progress Note        Name: Baylee Ceballos  MRN: [id number]  : 1995  Age: 29 y.o.  Date of assessment: 5/15/2025  PCP: SYDNEE Patrick  Persons in attendance: Patient  Total session time: 56 minutes      Topics addressed in psychotherapy include: Met with the patient in office for an individual therapy session.  The patient was last seen by this clinician on 2025.     Content of Therapy:   The patient discussed that she is looking forward to vacation with her family now that she has fewer restrictions with her ex .  The patient discussed that she is back on her ADHD medication post pregnancy.  The patient discussed that she feels she is getting more things done around the house.  The patient discussed that she is working on getting into a new routine.     Therapeutic Intervention:   During this session, the therapeutic focus was on validating the patient's progress while allowing her to be kind to herself for not being as productive when not on her medication.  Discussed with the patient life changes as all children and parents grow.     Plan: Continue working with the patient in managing emotions.       Impression:   1.Generalized anxiety disorder - improving  2.Attention deficit hyperactivity disorder (ADHD), predominantly inattentive type - returned to medication, improving     This dictation has been created using voice recognition software and/or scribes. The accuracy of the dictation is limited by the abilities of the software and the expertise of the scribes. I expect there may be some errors of grammar and possibly content. I made every attempt to manually correct the errors within my dictation. However, errors related to voice recognition software and/or scribes may still exist and should be interpreted within the appropriate context.    Objective Observations:   Participation:Active verbal participation, Attentive, Engaged, and  Open to feedback   Grooming:Casual   Cognition:Alert and Fully Oriented   Eye Contact:Good   Mood:Euthymic   Affect:Flexible, Full range, and Congruent with content   Thought Process:Logical and Goal-directed   Speech:Rate within normal limits and Volume within normal limits    Current Risk:   Suicide: low   Homicide: low   Self-Harm: low   Relapse: low   Safety Plan Reviewed: not applicable    Care Plan Updated: No    Does patient express agreement with the above plan? Yes     Diagnosis:  1. Generalized anxiety disorder    2. Attention deficit hyperactivity disorder (ADHD), predominantly inattentive type        Referral appointment(s) scheduled? No       CHLOE Bains

## 2025-05-22 DIAGNOSIS — F90.0 ATTENTION DEFICIT HYPERACTIVITY DISORDER (ADHD), PREDOMINANTLY INATTENTIVE TYPE: ICD-10-CM

## 2025-05-23 NOTE — TELEPHONE ENCOUNTER
Received request via: Pharmacy    Was the patient seen in the last year in this department? Yes    Does the patient have an active prescription (recently filled or refills available) for medication(s) requested? No    Pharmacy Name: [facility] [place]     Does the patient have group home Plus and need 100-day supply? (This applies to ALL medications) Patient does not have SCP

## 2025-05-26 RX ORDER — METHYLPHENIDATE HYDROCHLORIDE 54 MG/1
54 TABLET ORAL EVERY MORNING
Qty: 30 TABLET | Refills: 0 | Status: SHIPPED | OUTPATIENT
Start: 2025-05-26 | End: 2025-06-28

## 2025-05-28 PROCEDURE — RXMED WILLOW AMBULATORY MEDICATION CHARGE: Performed by: NURSE PRACTITIONER

## 2025-05-29 ENCOUNTER — PHARMACY VISIT (OUTPATIENT)
Dept: PHARMACY | Facility: MEDICAL CENTER | Age: 30
End: 2025-05-29
Payer: COMMERCIAL

## 2025-05-29 ENCOUNTER — OFFICE VISIT (OUTPATIENT)
Dept: BEHAVIORAL HEALTH | Facility: CLINIC | Age: 30
End: 2025-05-29
Payer: COMMERCIAL

## 2025-05-29 DIAGNOSIS — F41.1 GENERALIZED ANXIETY DISORDER: Primary | ICD-10-CM

## 2025-05-29 NOTE — PROGRESS NOTES
[facility]   Therapy Progress Note        Name: Baylee Ceballos  MRN: [id number]  : 1995  Age: 29 y.o.  Date of assessment: 2025  PCP: SYDNEE Patrick  Persons in attendance: Patient  Total session time: 58 minutes      Topics addressed in psychotherapy include: Met with the patient in office for an individual therapy session.  The patient was last seen by this clinician on May 15, 2025.     Content of Therapy:   The patient discussed that she is looking forward to having a breaking as her daughter will be visiting her own father if for parts of the summer.  The patient discussed that all of her trip planning to [place] is complete, and she feels accomplished about that.  The patient discussed that she feels her daughter has been lying and has received a consequence of not being able to go to the end of school dance.     Therapeutic Intervention:   During this session, the therapeutic focus was on validating the patient's progress and the benefits of her decisions.  Discussed with the patient his summer being very different for them.  Worked with the patient on identifying limits, positives and negatives of parenting and consequences.  Discussed with the patient not always being the nice parent in maintaining boundaries.     Plan: Continue working with the patient in managing emotions.       Impression:   1.Generalized anxiety disorder - improving  2.Attention deficit hyperactivity disorder (ADHD), predominantly inattentive type - returned to medication, improving     This dictation has been created using voice recognition software and/or scribes. The accuracy of the dictation is limited by the abilities of the software and the expertise of the scribes. I expect there may be some errors of grammar and possibly content. I made every attempt to manually correct the errors within my dictation. However, errors related to voice recognition software and/or scribes may still  exist and should be interpreted within the appropriate context.    Objective Observations:   Participation:Active verbal participation, Attentive, and Engaged   Grooming:Casual   Cognition:Alert and Fully Oriented   Eye Contact:Good   Mood:Euthymic   Affect:Flexible and Full range   Thought Process:Logical and Goal-directed   Speech:Rate within normal limits and Volume within normal limits    Current Risk:   Suicide: low   Homicide: low   Self-Harm: low   Relapse: low   Safety Plan Reviewed: not applicable    Care Plan Updated: No    Does patient express agreement with the above plan? Yes     Diagnosis:  1. Generalized anxiety disorder        Referral appointment(s) scheduled? No       VAISHALI Bains.

## 2025-06-23 ENCOUNTER — OFFICE VISIT (OUTPATIENT)
Dept: BEHAVIORAL HEALTH | Facility: CLINIC | Age: 30
End: 2025-06-23
Payer: COMMERCIAL

## 2025-06-23 DIAGNOSIS — F90.0 ATTENTION DEFICIT HYPERACTIVITY DISORDER (ADHD), PREDOMINANTLY INATTENTIVE TYPE: ICD-10-CM

## 2025-06-23 DIAGNOSIS — F41.1 GENERALIZED ANXIETY DISORDER: Primary | ICD-10-CM

## 2025-06-23 PROCEDURE — 90837 PSYTX W PT 60 MINUTES: CPT | Performed by: MARRIAGE & FAMILY THERAPIST

## 2025-06-23 NOTE — PROGRESS NOTES
[facility]   Therapy Progress Note        Name: Baylee Ceballos  MRN: [id number]  : 1995  Age: 29 y.o.  Date of assessment: 2025  PCP: SYDNEE Patrick  Persons in attendance: Patient  Total session time: 56 minutes      Topics addressed in psychotherapy include: Met with the patient in office for an individual therapy session.  The patient was last seen by this clinician on May 29, 2025.     Content of Therapy:   The patient discussed that her daughter is off to her father's for most of the summer.  The patient discussed that she has provided her daughter with paper, envelopes and stamps, so that she can write letters.  The patient discussed that her daughter will be moving into the fifth grade this next year are and she is concerned about her being at a new school.  The patient discussed that she is been keeping busy with their daughter gone.    Therapeutic Intervention:   During this session, the therapeutic focus was on validating the patient's preparation with her daughter's vacation.  Worked with the patient to manage her anxiety through appropriate worries rather than hypothetical worries.     Plan: Continue working with the patient in managing emotions.       Impression:   1.Generalized anxiety disorder - improving  2.Attention deficit hyperactivity disorder (ADHD), predominantly inattentive type - returned to medication, improving     This dictation has been created using voice recognition software and/or scribes. The accuracy of the dictation is limited by the abilities of the software and the expertise of the scribes. I expect there may be some errors of grammar and possibly content. I made every attempt to manually correct the errors within my dictation. However, errors related to voice recognition software and/or scribes may still exist and should be interpreted within the appropriate context.    Objective Observations:   Participation:Active verbal  participation, Attentive, Engaged, and Open to feedback   Grooming:Casual   Cognition:Alert and Fully Oriented   Eye Contact:Good   Mood:Euthymic   Affect:Flexible and Full range   Thought Process:Logical and Goal-directed   Speech:Rate within normal limits and Volume within normal limits    Current Risk:   Suicide: low   Homicide: low   Self-Harm: low   Relapse: low   Safety Plan Reviewed: not applicable    Care Plan Updated: No    Does patient express agreement with the above plan? Yes     Diagnosis:  1. Generalized anxiety disorder    2. Attention deficit hyperactivity disorder (ADHD), predominantly inattentive type        Referral appointment(s) scheduled? No       CHLOE Bains

## 2025-07-15 ENCOUNTER — OFFICE VISIT (OUTPATIENT)
Dept: BEHAVIORAL HEALTH | Facility: CLINIC | Age: 30
End: 2025-07-15
Payer: COMMERCIAL

## 2025-07-15 DIAGNOSIS — F90.0 ATTENTION DEFICIT HYPERACTIVITY DISORDER (ADHD), PREDOMINANTLY INATTENTIVE TYPE: ICD-10-CM

## 2025-07-15 DIAGNOSIS — F41.1 GENERALIZED ANXIETY DISORDER: Primary | ICD-10-CM

## 2025-07-15 PROCEDURE — 90837 PSYTX W PT 60 MINUTES: CPT | Performed by: MARRIAGE & FAMILY THERAPIST

## 2025-07-15 NOTE — PROGRESS NOTES
[facility]   Therapy Progress Note        Name: Baylee Ceballos  MRN: [id number]  : 1995  Age: 30 y.o.  Date of assessment: 7/15/2025  PCP: SYDNEE Patrick  Persons in attendance: Patient  Total session time: 57 minutes      Topics addressed in psychotherapy include: Met with the patient in office for an individual therapy session.  The patient was last seen by this clinician on 2025.     Content of Therapy:   The patient discussed that the family had a good vacation in [place].  Patient reported that she took one of her daughter's friends along on the trip.  The patient discussed that she is also been working on building relationships with those parents.  Patient discussed having difficulty in completing tasks and getting into a rhythm.  She feels her  does not understand the difficulties she faces.     Therapeutic Intervention:   During this session, the therapeutic focus was on validating the patient's parenting while addressing her own needs.  Validated building her group of friends and daughter's friends.  Discussed with the patient the pomodoro technique of time management.     Plan: Continue working with the patient in managing emotions.       Impression:   1.Generalized anxiety disorder - improving  2.Attention deficit hyperactivity disorder (ADHD), predominantly inattentive type - returned to medication, improving    Objective Observations:   Participation:Active verbal participation, Attentive, and Engaged   Grooming:Casual   Cognition:Alert and Fully Oriented   Eye Contact:Good   Mood:Euthymic   Affect:Flexible and Full range   Thought Process:Logical and Goal-directed   Speech:Rate within normal limits and Volume within normal limits    Current Risk:   Suicide: low   Homicide: low   Self-Harm: low   Relapse: low   Safety Plan Reviewed: not applicable    Care Plan Updated: No    Does patient express agreement with the above plan? Yes      Diagnosis:  1. Generalized anxiety disorder    2. Attention deficit hyperactivity disorder (ADHD), predominantly inattentive type        Referral appointment(s) scheduled? VAISHALI Choi.

## 2025-07-16 DIAGNOSIS — F90.0 ATTENTION DEFICIT HYPERACTIVITY DISORDER (ADHD), PREDOMINANTLY INATTENTIVE TYPE: ICD-10-CM

## 2025-07-16 PROCEDURE — RXMED WILLOW AMBULATORY MEDICATION CHARGE: Performed by: NURSE PRACTITIONER

## 2025-07-16 RX ORDER — METHYLPHENIDATE HYDROCHLORIDE 54 MG/1
54 TABLET ORAL EVERY MORNING
Qty: 30 TABLET | Refills: 0 | Status: SHIPPED | OUTPATIENT
Start: 2025-08-13 | End: 2025-07-21

## 2025-07-16 RX ORDER — METHYLPHENIDATE HYDROCHLORIDE 54 MG/1
54 TABLET ORAL EVERY MORNING
Qty: 30 TABLET | Refills: 0 | Status: SHIPPED | OUTPATIENT
Start: 2025-07-16 | End: 2025-07-21

## 2025-07-16 RX ORDER — METHYLPHENIDATE HYDROCHLORIDE 54 MG/1
54 TABLET ORAL EVERY MORNING
Qty: 30 TABLET | Refills: 0 | Status: SHIPPED | OUTPATIENT
Start: 2025-09-10 | End: 2025-07-29

## 2025-07-18 ENCOUNTER — PHARMACY VISIT (OUTPATIENT)
Dept: PHARMACY | Facility: MEDICAL CENTER | Age: 30
End: 2025-07-18
Payer: COMMERCIAL

## 2025-07-21 ENCOUNTER — OFFICE VISIT (OUTPATIENT)
Dept: MEDICAL GROUP | Facility: MEDICAL CENTER | Age: 30
End: 2025-07-21
Payer: COMMERCIAL

## 2025-07-21 VITALS
WEIGHT: 148 LBS | OXYGEN SATURATION: 98 % | TEMPERATURE: 97.2 F | HEIGHT: 63 IN | DIASTOLIC BLOOD PRESSURE: 72 MMHG | RESPIRATION RATE: 14 BRPM | HEART RATE: 68 BPM | BODY MASS INDEX: 26.22 KG/M2 | SYSTOLIC BLOOD PRESSURE: 110 MMHG

## 2025-07-21 DIAGNOSIS — F90.0 ATTENTION DEFICIT HYPERACTIVITY DISORDER (ADHD), PREDOMINANTLY INATTENTIVE TYPE: Primary | ICD-10-CM

## 2025-07-21 PROCEDURE — 3074F SYST BP LT 130 MM HG: CPT | Performed by: NURSE PRACTITIONER

## 2025-07-21 PROCEDURE — 99213 OFFICE O/P EST LOW 20 MIN: CPT | Performed by: NURSE PRACTITIONER

## 2025-07-21 PROCEDURE — 3078F DIAST BP <80 MM HG: CPT | Performed by: NURSE PRACTITIONER

## 2025-07-21 PROCEDURE — RXMED WILLOW AMBULATORY MEDICATION CHARGE: Performed by: NURSE PRACTITIONER

## 2025-07-21 RX ORDER — DEXTROAMPHETAMINE SACCHARATE, AMPHETAMINE ASPARTATE, DEXTROAMPHETAMINE SULFATE AND AMPHETAMINE SULFATE 2.5; 2.5; 2.5; 2.5 MG/1; MG/1; MG/1; MG/1
10 TABLET ORAL 2 TIMES DAILY
Qty: 60 TABLET | Refills: 0 | Status: SHIPPED | OUTPATIENT
Start: 2025-07-21 | End: 2025-08-22

## 2025-07-21 NOTE — PROGRESS NOTES
"Subjective:     HPI:     Baylee Ceballos is a 30 y.o. female presents to discuss:   Chief Complaint   Patient presents with    Follow-Up     Verbal consent was acquired by the patient to use [employer] ambient listening note generation during this visit Yes     Follow up ADHD  -discuss medication.   -noticed very modest improvement with Concerta despite the 54mg dosing. She notes delayed onset of effects. Reports uptick in migraines in which sumatriptan is helpful; trigger -light exposure.  Would like to discuss potential for change in regimen.     ROS: : see above    Current Medications[1]    Allergies[2]    Objective:     Vitals: /72   Pulse 68   Temp 36.2 °C (97.2 °F) (Temporal)   Resp 14   Ht 1.6 m (5' 3\")   Wt 67.1 kg (148 lb)   LMP 04/12/2025 Comment: IUD  SpO2 98%   Breastfeeding No   BMI 26.22 kg/m²      General: Alert, pleasant, NAD  HEENT: Normocephalic.  Neck supple.   Respiratory: no distress, no audible wheezing, RR -WNL  Heart: HRR> no mumur  Skin: Warm, dry, no rashes.  Extremities: No leg edema. No discoloration  Neurological: No tremors  Psych:  Affect/mood is normal, judgement is good, memory is intact, grooming is appropriate.  Results       Assessment/Plan:      1. Attention deficit hyperactivity disorder (ADHD), predominantly inattentive type  Chronic. Modest improvement noted on Concerta- however it is not providing the optimal level of support for her current needs.  No significant improvement of symptoms at maximum dose of 54mg. She has experience an uptick in migraines since starting on the Concerta.   With shared decision making we discussed switching to immediate release Adderall as this formulation may offer a different profile of effects that could potentially be more beneficial also potential for different side effect profile that is more tolerable.  At this time recommend trial of Adderall 10 mg immediate release twice daily.  We have discussed common medication " side effects as well as adverse drug effects.  She will keep her follow-up appointment in September to review.  She will stop the Concerta.   - amphetamine-dextroamphetamine (ADDERALL) 10 MG Tab; Take 1 Tablet by mouth 2 times a day for 30 days.  Dispense: 60 Tablet; Refill: 0     Return for keep follow up appt. .      General Recommendations:   Smoking: recommend complete avoidance of all tobacco products  Alcohol: recommend limiting consumption  Physical Activity: goal is 30 min of moderate activity 5 times a week  Weight Management and Nutrition: high vegetable, high protein diet like the Mediterranean diet, portion control, avoid excessive sugars, Low Glycemic Index foods, adequate hydration, sleep.     Please note that this dictation was created using voice recognition software. I have made every reasonable attempt to correct obvious errors, but I expect that there are errors of grammar and possibly content that I did not discover before finalizing the note.     Radha RANDHAWA         [1]   Current Outpatient Medications:     amphetamine-dextroamphetamine (ADDERALL) 10 MG Tab, Take 1 Tablet by mouth 2 times a day for 30 days., Disp: 60 Tablet, Rfl: 0    [START ON 9/10/2025] methylphenidate (CONCERTA) 54 MG CR tablet, Take 1 Tablet by mouth every morning for 30 days., Disp: 30 Tablet, Rfl: 0    COVID-19 mRNA Vac-Zoltan,Pfizer, (COMIRNATY) 30 MCG/0.3ML Suspension Prefilled Syringe injection, Inject 0.3 Ml  into the shoulder, Disp: 0.3 mL, Rfl: 0    SUMAtriptan (IMITREX) 50 MG Tab, Take 1 Tablet by mouth one time as needed for Migraine for up to 1 dose., Disp: 10 Tablet, Rfl: 3    injection RSV Pre-Fusion F A&B Vac Rcmb (ABRYSVO) 120 MCG/0.5ML Recon Soln, Inject  into the shoulder, thigh, or buttocks., Disp: 0.5 mL, Rfl: 0    albuterol 108 (90 Base) MCG/ACT Aero Soln inhalation aerosol, Inhale 1-2 Puffs every 6 hours as needed for Shortness of Breath., Disp: 8.5 g, Rfl: 11    cetirizine (ZYRTEC) 10 MG  Tab, Take 20 mg by mouth every day., Disp: , Rfl:   [2] No Known Allergies

## 2025-07-23 ENCOUNTER — PHARMACY VISIT (OUTPATIENT)
Dept: PHARMACY | Facility: MEDICAL CENTER | Age: 30
End: 2025-07-23
Payer: COMMERCIAL

## 2025-07-29 ENCOUNTER — PHARMACY VISIT (OUTPATIENT)
Dept: PHARMACY | Facility: MEDICAL CENTER | Age: 30
End: 2025-07-29
Payer: COMMERCIAL

## 2025-07-29 ENCOUNTER — OFFICE VISIT (OUTPATIENT)
Dept: URGENT CARE | Facility: CLINIC | Age: 30
End: 2025-07-29
Payer: COMMERCIAL

## 2025-07-29 VITALS
OXYGEN SATURATION: 100 % | WEIGHT: 148.6 LBS | SYSTOLIC BLOOD PRESSURE: 122 MMHG | HEART RATE: 60 BPM | RESPIRATION RATE: 18 BRPM | DIASTOLIC BLOOD PRESSURE: 80 MMHG | TEMPERATURE: 97.9 F | BODY MASS INDEX: 26.33 KG/M2 | HEIGHT: 63 IN

## 2025-07-29 DIAGNOSIS — H04.301 DACRYOCYSTITIS OF RIGHT LACRIMAL SAC: Primary | ICD-10-CM

## 2025-07-29 PROCEDURE — RXMED WILLOW AMBULATORY MEDICATION CHARGE: Performed by: NURSE PRACTITIONER

## 2025-07-29 RX ORDER — CEPHALEXIN 500 MG/1
500 CAPSULE ORAL 4 TIMES DAILY
Qty: 28 CAPSULE | Refills: 0 | Status: SHIPPED | OUTPATIENT
Start: 2025-07-29 | End: 2025-08-05

## 2025-07-29 NOTE — PROGRESS NOTES
"Baylee Ceballos is a 30 y.o. female who presents for Eye Swelling (R eye this morning )      HPI  This is a new problem. Baylee Ceballos is a 30 y.o. patient who presents to urgent care with c/o: Right eye pain and swelling.  There is a red area by her tear duct on the right eye.  Swollen.  She has swelling up to her eyelid.  She denies eye drainage, vision change.  She normally wears contact lenses but is not wearing them right now.  She does have a pair glasses she can wear.  Treatments tried: Warm compress.  No other aggravating leaving factors.    ROS See HPI    Allergies:     Allergies[1]    PMSFS Hx:  Past Medical History[2]  Past Surgical History[3]  Family History   Problem Relation Age of Onset    Psychiatric Illness Maternal Grandmother      Social History     Tobacco Use    Smoking status: Never    Smokeless tobacco: Never   Substance Use Topics    Alcohol use: Not Currently         Problems:   Problem List[4]    Medications:   Medications Ordered Prior to Encounter[5]     Objective:     /80   Pulse 60   Temp 36.6 °C (97.9 °F) (Temporal)   Resp 18   Ht 1.6 m (5' 3\")   Wt 67.4 kg (148 lb 9.6 oz)   LMP 04/12/2025 Comment: IUD  SpO2 100%   BMI 26.32 kg/m²     Physical Exam  Vitals and nursing note reviewed.   Constitutional:       Appearance: Normal appearance.   HENT:      Head:        Mouth/Throat:      Mouth: Mucous membranes are moist.   Cardiovascular:      Rate and Rhythm: Normal rate and regular rhythm.      Pulses: Normal pulses.      Heart sounds: Normal heart sounds.   Pulmonary:      Effort: Pulmonary effort is normal.      Breath sounds: Normal breath sounds.   Skin:     General: Skin is warm.      Capillary Refill: Capillary refill takes less than 2 seconds.   Neurological:      Mental Status: She is alert and oriented to person, place, and time.   Psychiatric:         Mood and Affect: Mood normal.         Behavior: Behavior normal.         Assessment /Associated Orders:  "     1. Dacryocystitis of right lacrimal sac  cephALEXin (KEFLEX) 500 MG Cap          Medical Decision Making:    Baylee Ceballos is a very pleasant 30 y.o. female who is clinically stable at today's acute urgent care visit. Presents with acute problem/ concern today.    No acute distress is noted at the time of the visit.  VSS. Appropriate for outpatient care at this time.     Educated in proper administration of  prescription medication(s) ordered today including safety, possible SE, risks, benefits, rationale and alternatives to therapy.   Warm compresses BID/ TID/ prn   Gentle circular massage in affected area prn    OTC  analgesic of choice (acetaminophen or NSAID) prn pain. Follow manufactures dosing and safety precautions.    Through shared decision making a discussion of the Dx and DDx, management options (risks,benefits, and alternatives to planned treatment), natural progression, supportive care and indications for immediate follow-up discussed. Expressed understanding and the treatment plan was agreed upon.    Questions were encouraged and answered     Follow Up:   Return to urgent care prn if new or worsening sx or if there is no improvement in condition prn.    Educated in Red flags and indications to immediately call 911 or present to the Emergency Department.           Please note that this dictation was created using voice recognition software. I have worked with consultants from the vendor as well as technical experts from [employer] to optimize the interface. I have made every reasonable attempt to correct obvious errors, but I expect that there are errors of grammar and possibly content that I did not discover before finalizing the note.  This note was electronically signed by provider           [1] No Known Allergies  [2]   Past Medical History:  Diagnosis Date    Allergy     seasonal    Asthma     mostly exercise induced/cold temperatures    Depression     Generalized anxiety disorder  05/03/2022    Migraine     Psoriasis 01/17/2022    Urticaria 12/26/2021   [3]   Past Surgical History:  Procedure Laterality Date    APPENDECTOMY     [4]   Patient Active Problem List  Diagnosis    Exercise-induced asthma    Allergies    Psoriasis    Generalized anxiety disorder    Attention deficit hyperactivity disorder (ADHD), predominantly inattentive type   [5]   Current Outpatient Medications on File Prior to Visit   Medication Sig Dispense Refill    amphetamine-dextroamphetamine (ADDERALL) 10 MG Tab Take 1 Tablet by mouth 2 times a day for 30 days. Discontinue Concerta. 60 Tablet 0    albuterol 108 (90 Base) MCG/ACT Aero Soln inhalation aerosol Inhale 1-2 Puffs every 6 hours as needed for Shortness of Breath. 8.5 g 11    cetirizine (ZYRTEC) 10 MG Tab Take 20 mg by mouth every day.       No current facility-administered medications on file prior to visit.

## 2025-08-07 ENCOUNTER — APPOINTMENT (OUTPATIENT)
Dept: BEHAVIORAL HEALTH | Facility: CLINIC | Age: 30
End: 2025-08-07
Payer: COMMERCIAL

## 2025-09-22 ENCOUNTER — APPOINTMENT (OUTPATIENT)
Dept: MEDICAL GROUP | Facility: MEDICAL CENTER | Age: 30
End: 2025-09-22
Payer: COMMERCIAL